# Patient Record
Sex: FEMALE | Race: WHITE | NOT HISPANIC OR LATINO | URBAN - METROPOLITAN AREA
[De-identification: names, ages, dates, MRNs, and addresses within clinical notes are randomized per-mention and may not be internally consistent; named-entity substitution may affect disease eponyms.]

---

## 2018-07-20 ENCOUNTER — EMERGENCY (EMERGENCY)
Facility: HOSPITAL | Age: 83
LOS: 1 days | Discharge: HOME | End: 2018-07-20
Attending: PERSONAL EMERGENCY RESPONSE ATTENDANT
Payer: MEDICARE

## 2018-07-20 VITALS
TEMPERATURE: 98 F | RESPIRATION RATE: 18 BRPM | OXYGEN SATURATION: 96 % | HEART RATE: 75 BPM | DIASTOLIC BLOOD PRESSURE: 90 MMHG | SYSTOLIC BLOOD PRESSURE: 189 MMHG

## 2018-07-20 LAB
ALBUMIN SERPL ELPH-MCNC: 4.6 G/DL — SIGNIFICANT CHANGE UP (ref 3.3–5)
ALP SERPL-CCNC: 68 U/L — SIGNIFICANT CHANGE UP (ref 40–120)
ALT FLD-CCNC: 23 U/L — SIGNIFICANT CHANGE UP (ref 10–45)
ANION GAP SERPL CALC-SCNC: 16 MMOL/L — SIGNIFICANT CHANGE UP (ref 5–17)
AST SERPL-CCNC: 27 U/L — SIGNIFICANT CHANGE UP (ref 10–40)
BASE EXCESS BLDV CALC-SCNC: 3.4 MMOL/L — HIGH (ref -2–2)
BASOPHILS # BLD AUTO: 0.1 K/UL — SIGNIFICANT CHANGE UP (ref 0–0.2)
BASOPHILS NFR BLD AUTO: 1.1 % — SIGNIFICANT CHANGE UP (ref 0–2)
BILIRUB SERPL-MCNC: 0.7 MG/DL — SIGNIFICANT CHANGE UP (ref 0.2–1.2)
BUN SERPL-MCNC: 30 MG/DL — HIGH (ref 7–23)
CA-I SERPL-SCNC: 1.12 MMOL/L — SIGNIFICANT CHANGE UP (ref 1.12–1.3)
CALCIUM SERPL-MCNC: 9.7 MG/DL — SIGNIFICANT CHANGE UP (ref 8.4–10.5)
CHLORIDE BLDV-SCNC: 94 MMOL/L — LOW (ref 96–108)
CHLORIDE SERPL-SCNC: 89 MMOL/L — LOW (ref 96–108)
CO2 BLDV-SCNC: 29 MMOL/L — SIGNIFICANT CHANGE UP (ref 22–30)
CO2 SERPL-SCNC: 24 MMOL/L — SIGNIFICANT CHANGE UP (ref 22–31)
CREAT SERPL-MCNC: 0.96 MG/DL — SIGNIFICANT CHANGE UP (ref 0.5–1.3)
EOSINOPHIL # BLD AUTO: 0.1 K/UL — SIGNIFICANT CHANGE UP (ref 0–0.5)
EOSINOPHIL NFR BLD AUTO: 0.9 % — SIGNIFICANT CHANGE UP (ref 0–6)
GAS PNL BLDV: 125 MMOL/L — LOW (ref 136–145)
GAS PNL BLDV: SIGNIFICANT CHANGE UP
GAS PNL BLDV: SIGNIFICANT CHANGE UP
GLUCOSE BLDV-MCNC: 104 MG/DL — HIGH (ref 70–99)
GLUCOSE SERPL-MCNC: 103 MG/DL — HIGH (ref 70–99)
HCO3 BLDV-SCNC: 28 MMOL/L — SIGNIFICANT CHANGE UP (ref 21–29)
HCT VFR BLD CALC: 49 % — HIGH (ref 34.5–45)
HCT VFR BLDA CALC: 49 % — SIGNIFICANT CHANGE UP (ref 39–50)
HGB BLD CALC-MCNC: 15.9 G/DL — HIGH (ref 11.5–15.5)
HGB BLD-MCNC: 16.4 G/DL — HIGH (ref 11.5–15.5)
HOROWITZ INDEX BLDV+IHG-RTO: SIGNIFICANT CHANGE UP
LACTATE BLDV-MCNC: 2.1 MMOL/L — HIGH (ref 0.7–2)
LIDOCAIN IGE QN: 47 U/L — SIGNIFICANT CHANGE UP (ref 7–60)
LYMPHOCYTES # BLD AUTO: 2.3 K/UL — SIGNIFICANT CHANGE UP (ref 1–3.3)
LYMPHOCYTES # BLD AUTO: 24.2 % — SIGNIFICANT CHANGE UP (ref 13–44)
MCHC RBC-ENTMCNC: 31.7 PG — SIGNIFICANT CHANGE UP (ref 27–34)
MCHC RBC-ENTMCNC: 33.6 GM/DL — SIGNIFICANT CHANGE UP (ref 32–36)
MCV RBC AUTO: 94.4 FL — SIGNIFICANT CHANGE UP (ref 80–100)
MONOCYTES # BLD AUTO: 0.9 K/UL — SIGNIFICANT CHANGE UP (ref 0–0.9)
MONOCYTES NFR BLD AUTO: 9.6 % — SIGNIFICANT CHANGE UP (ref 2–14)
NEUTROPHILS # BLD AUTO: 6 K/UL — SIGNIFICANT CHANGE UP (ref 1.8–7.4)
NEUTROPHILS NFR BLD AUTO: 64.2 % — SIGNIFICANT CHANGE UP (ref 43–77)
PCO2 BLDV: 43 MMHG — SIGNIFICANT CHANGE UP (ref 35–50)
PH BLDV: 7.43 — SIGNIFICANT CHANGE UP (ref 7.35–7.45)
PLATELET # BLD AUTO: 211 K/UL — SIGNIFICANT CHANGE UP (ref 150–400)
PO2 BLDV: 39 MMHG — SIGNIFICANT CHANGE UP (ref 25–45)
POTASSIUM BLDV-SCNC: 3.6 MMOL/L — SIGNIFICANT CHANGE UP (ref 3.5–5.3)
POTASSIUM SERPL-MCNC: 3.9 MMOL/L — SIGNIFICANT CHANGE UP (ref 3.5–5.3)
POTASSIUM SERPL-SCNC: 3.9 MMOL/L — SIGNIFICANT CHANGE UP (ref 3.5–5.3)
PROT SERPL-MCNC: 8 G/DL — SIGNIFICANT CHANGE UP (ref 6–8.3)
RBC # BLD: 5.19 M/UL — SIGNIFICANT CHANGE UP (ref 3.8–5.2)
RBC # FLD: 13.4 % — SIGNIFICANT CHANGE UP (ref 10.3–14.5)
SAO2 % BLDV: 70 % — SIGNIFICANT CHANGE UP (ref 67–88)
SODIUM SERPL-SCNC: 129 MMOL/L — LOW (ref 135–145)
WBC # BLD: 9.4 K/UL — SIGNIFICANT CHANGE UP (ref 3.8–10.5)
WBC # FLD AUTO: 9.4 K/UL — SIGNIFICANT CHANGE UP (ref 3.8–10.5)

## 2018-07-20 PROCEDURE — 99284 EMERGENCY DEPT VISIT MOD MDM: CPT

## 2018-07-20 PROCEDURE — 74177 CT ABD & PELVIS W/CONTRAST: CPT | Mod: 26

## 2018-07-20 RX ORDER — SODIUM CHLORIDE 9 MG/ML
1000 INJECTION INTRAMUSCULAR; INTRAVENOUS; SUBCUTANEOUS ONCE
Qty: 0 | Refills: 0 | Status: COMPLETED | OUTPATIENT
Start: 2018-07-20 | End: 2018-07-20

## 2018-07-20 NOTE — ED PROVIDER NOTE - OBJECTIVE STATEMENT
89 yo f with pmhx of htn, chf and pshx of total hysterectomy and cholecystectomy presents with bilateral lower abdominal pain since yesterday as well as loose stools. No radiation into back or chest. Took advil in the morning and imodium approximately 6.5 hours ago. Also had palpitations this morning and a headache. Denies burning urination, cp, nausea but has increased frequency of urination. Had one episode of vomiting 5 days ago, but none today. Takes baby aspirin. 89 yo f with pmhx of htn, chf and pshx of total hysterectomy and cholecystectomy presents with bilateral lower abdominal pain since earlier today. No radiation into back or chest. had been having constipation for the past several days, but now has diarrhea after taking laxatives. Took advil in the morning and imodium approximately 6.5 hours ago. Also had palpitations this morning and a headache. Denies burning urination, cp, nausea but has increased frequency of urination. Had one episode of vomiting 5 days ago, but none today. Takes baby aspirin. 89 yo f with pmhx of htn, chf and pshx of total hysterectomy and cholecystectomy presents with bilateral lower abdominal pain since earlier today. No radiation into back or chest. had been having constipation for the past several days, but now has diarrhea after taking laxatives. Took advil in the morning and imodium approximately 6.5 hours ago. Also had palpitations this morning and a headache. Denies burning urination, cp, nausea but has increased frequency of urination. Had one episode of non-bloody, nonbilious vomiting 5 days ago, but none today. Takes baby aspirin.

## 2018-07-20 NOTE — ED PROVIDER NOTE - NS_ ATTENDINGSCRIBEDETAILS _ED_A_ED_FT
Attending MD Davis.  Agree with above.  PT seen and assessed with scribe assistance in documentation in real time.

## 2018-07-20 NOTE — ED PROVIDER NOTE - CHPI ED SYMPTOMS POS
palpitations, headache, increased urinary frequency/PAIN DIARRHEA/palpitations, headache, increased urinary frequency/PAIN/VOMITING

## 2018-07-20 NOTE — ED ADULT NURSE NOTE - OBJECTIVE STATEMENT
89 y/o F patient presents to ED from home c/o abdominal pain and constipation x1 week. Patient reports decreased PO intake the past few days. As per patient she took Imodium with no relief. Patient reports last BM was 2 hours ago and was soft and "normal" in color. Patient states abdominal pain is non radiating, continuous and located in left and right lower quadrants. Patient A&Ox3. lungs CTA. skin warm and intact. abdominal tenderness noted in lower abdominal quadrants. Patient denies HA, dizziness, SOB, chest pain, N/V/D, bladder changes, dysuria, hematuria, blood in stool, fevers/chills. VSS. Safety and comfort measures provided and maintained. Family bedside. MD bedside.

## 2018-07-20 NOTE — ED PROVIDER NOTE - CARE PLAN
Principal Discharge DX:	Abdominal pain, unspecified abdominal location  Goal:	Bilateral lower quadrants Principal Discharge DX:	Abdominal pain, unspecified abdominal location  Goal:	Bilateral lower quadrants  Assessment and plan of treatment:	You were seen and evaluated in the emergency department for abdominal pain. You had a thorough evaluation including an exam, labs and imaging. You were given medications for comfort. Your workup did not demonstrate any dangerous findings. Your hernia was evaluated by surgeons who do not think it as the cause of your pain. This does not mean that your pain is not real, only that we were unable to find a dangerous or life-threatening cause. Please read the attached information sheets as they will provide useful information regarding your condition.    It is important to understand that while your workup was reassuring, no workup can completely exclude all concerning conditions. Therfore, please return if you develop worsening or concerning new symptoms including worsening pain, pain that spreads to new places, inability to tolerate an oral diet, new and worsening fevers and chills, weakness, inability to pass stool or flatulent gas, those included on the attached information sheets, or other findings concerning to you. Please take all your medications as prescribed.    You may take up to 1000mg of acetaminophen (Tylenol) every 6 hours as needed for pain. Many prescription pain medications and cough medications contain acetaminophen. If you take these medications, please discuss with your provider or primary care doctor if you need to adjust the dose of acetaminophen you can take. You may take 80mg of simethicone every 6 hours as needed for gas pain or discomfort.    Please visit a surgical supply store and buy an elastic hernia belt which may help protect your hernia.    Please be sure to follow up with your regular doctor in the next 2 days.   Please follow up with general surgery clinic in 1-2 weeks as needed.  Secondary Diagnosis:	Hernia

## 2018-07-20 NOTE — ED PROVIDER NOTE - PLAN OF CARE
Bilateral lower quadrants You were seen and evaluated in the emergency department for abdominal pain. You had a thorough evaluation including an exam, labs and imaging. You were given medications for comfort. Your workup did not demonstrate any dangerous findings. Your hernia was evaluated by surgeons who do not think it as the cause of your pain. This does not mean that your pain is not real, only that we were unable to find a dangerous or life-threatening cause. Please read the attached information sheets as they will provide useful information regarding your condition.    It is important to understand that while your workup was reassuring, no workup can completely exclude all concerning conditions. Therfore, please return if you develop worsening or concerning new symptoms including worsening pain, pain that spreads to new places, inability to tolerate an oral diet, new and worsening fevers and chills, weakness, inability to pass stool or flatulent gas, those included on the attached information sheets, or other findings concerning to you. Please take all your medications as prescribed.    You may take up to 1000mg of acetaminophen (Tylenol) every 6 hours as needed for pain. Many prescription pain medications and cough medications contain acetaminophen. If you take these medications, please discuss with your provider or primary care doctor if you need to adjust the dose of acetaminophen you can take. You may take 80mg of simethicone every 6 hours as needed for gas pain or discomfort.    Please visit a surgical supply store and buy an elastic hernia belt which may help protect your hernia.    Please be sure to follow up with your regular doctor in the next 2 days.   Please follow up with general surgery clinic in 1-2 weeks as needed.

## 2018-07-20 NOTE — ED PROVIDER NOTE - PROGRESS NOTE DETAILS
Attending MD Davis.  Pt and daughter updated re: findings of small hernia without current obstruction.  Pt and daughter reassured that with improvement in her pain she will likely be cleared to d/c but offered surg follow-up/cx.  Pt and daughter both concerned that severity of her pain earlier warrants surg consult.  Surg consulted and to eval pt.  Pt states this R inguinal defect has been present for several years and she only had severe pain this evening.  Concern for transient strangulation.  Surg to see. Attending MD Davis.  Pt remains in stable condition with surg cx pending.  Stable at time of signout ot incoming team pending surg consult. ATTENDING MD Alysha: Seen by surgery who feel hernia is currently nontender, easily reducible. Low risk for transient incarceration for chronic hernia without acute changes. Pt is comfortable and wants to leave. We will PO challenge. If patient is stable we will discharge, advise hernia belt, outpatient f/u. Pt is comfortable with this plan. ATTENDING MD Alysha: Tolerated PO well. Remains pain free. Stable for DC.

## 2018-07-20 NOTE — ED PROVIDER NOTE - MEDICAL DECISION MAKING DETAILS
Pt is 89 yo f with pmhx of htn, chf s/p remote cholecystectomy and remote total hysterectomy p/w several days of constipation followed by diarrhea after laxatives. Now took imodium earlier today. Onset of severe 10/10 abdominal pain bilateral lower quadrant this evening, not associated with n/v and no radiation to back or chest. pt states that pain is largely resolved on arrival to ED. Limited TTP, no abdominal distention. Possible hx of Afib, on 81mg of aspirin only. Will obtain screening, ekg, and vbg ans well as labs and obtain ct abdomen/pelvis. Pt is 89 yo f with pmhx of htn, chf s/p remote cholecystectomy and remote total hysterectomy p/w several days of constipation followed by diarrhea after laxatives. Now took imodium earlier today. Onset of severe 10/10 abdominal pain bilateral lower quadrant this evening, not associated with n/v and no radiation to back or chest. pt states that pain is largely resolved on arrival to ED. Limited TTP, no abdominal distention. Possible hx of Afib, on 81mg of aspirin only. Will obtain screening, ekg, and vbg as well as labs and obtain ct abdomen/pelvis.

## 2018-07-21 VITALS
DIASTOLIC BLOOD PRESSURE: 81 MMHG | TEMPERATURE: 98 F | HEART RATE: 68 BPM | RESPIRATION RATE: 18 BRPM | SYSTOLIC BLOOD PRESSURE: 157 MMHG | OXYGEN SATURATION: 96 %

## 2018-07-21 DIAGNOSIS — Z90.710 ACQUIRED ABSENCE OF BOTH CERVIX AND UTERUS: Chronic | ICD-10-CM

## 2018-07-21 DIAGNOSIS — Z90.49 ACQUIRED ABSENCE OF OTHER SPECIFIED PARTS OF DIGESTIVE TRACT: Chronic | ICD-10-CM

## 2018-07-21 LAB
APPEARANCE UR: CLEAR — SIGNIFICANT CHANGE UP
BILIRUB UR-MCNC: NEGATIVE — SIGNIFICANT CHANGE UP
COLOR SPEC: COLORLESS — SIGNIFICANT CHANGE UP
CULTURE RESULTS: SIGNIFICANT CHANGE UP
DIFF PNL FLD: NEGATIVE — SIGNIFICANT CHANGE UP
GLUCOSE UR QL: NEGATIVE — SIGNIFICANT CHANGE UP
KETONES UR-MCNC: NEGATIVE — SIGNIFICANT CHANGE UP
LEUKOCYTE ESTERASE UR-ACNC: NEGATIVE — SIGNIFICANT CHANGE UP
NITRITE UR-MCNC: NEGATIVE — SIGNIFICANT CHANGE UP
PH UR: 7 — SIGNIFICANT CHANGE UP (ref 5–8)
PROT UR-MCNC: NEGATIVE — SIGNIFICANT CHANGE UP
RBC CASTS # UR COMP ASSIST: SIGNIFICANT CHANGE UP /HPF (ref 0–2)
SP GR SPEC: 1.02 — SIGNIFICANT CHANGE UP (ref 1.01–1.02)
SPECIMEN SOURCE: SIGNIFICANT CHANGE UP
UROBILINOGEN FLD QL: NEGATIVE — SIGNIFICANT CHANGE UP
WBC UR QL: SIGNIFICANT CHANGE UP /HPF (ref 0–5)

## 2018-07-21 PROCEDURE — 82435 ASSAY OF BLOOD CHLORIDE: CPT

## 2018-07-21 PROCEDURE — 85027 COMPLETE CBC AUTOMATED: CPT

## 2018-07-21 PROCEDURE — 99284 EMERGENCY DEPT VISIT MOD MDM: CPT

## 2018-07-21 PROCEDURE — 83690 ASSAY OF LIPASE: CPT

## 2018-07-21 PROCEDURE — 74177 CT ABD & PELVIS W/CONTRAST: CPT

## 2018-07-21 PROCEDURE — 81001 URINALYSIS AUTO W/SCOPE: CPT

## 2018-07-21 PROCEDURE — 87086 URINE CULTURE/COLONY COUNT: CPT

## 2018-07-21 PROCEDURE — 80053 COMPREHEN METABOLIC PANEL: CPT

## 2018-07-21 PROCEDURE — 84295 ASSAY OF SERUM SODIUM: CPT

## 2018-07-21 PROCEDURE — 84132 ASSAY OF SERUM POTASSIUM: CPT

## 2018-07-21 PROCEDURE — 85014 HEMATOCRIT: CPT

## 2018-07-21 PROCEDURE — 82803 BLOOD GASES ANY COMBINATION: CPT

## 2018-07-21 PROCEDURE — 82947 ASSAY GLUCOSE BLOOD QUANT: CPT

## 2018-07-21 PROCEDURE — 82330 ASSAY OF CALCIUM: CPT

## 2018-07-21 PROCEDURE — 83605 ASSAY OF LACTIC ACID: CPT

## 2018-07-21 RX ORDER — SIMETHICONE 80 MG/1
80 TABLET, CHEWABLE ORAL ONCE
Qty: 0 | Refills: 0 | Status: COMPLETED | OUTPATIENT
Start: 2018-07-21 | End: 2018-07-21

## 2018-07-21 RX ORDER — ACETAMINOPHEN 500 MG
650 TABLET ORAL ONCE
Qty: 0 | Refills: 0 | Status: COMPLETED | OUTPATIENT
Start: 2018-07-21 | End: 2018-07-21

## 2018-07-21 RX ADMIN — Medication 650 MILLIGRAM(S): at 04:59

## 2018-07-21 RX ADMIN — SODIUM CHLORIDE 500 MILLILITER(S): 9 INJECTION INTRAMUSCULAR; INTRAVENOUS; SUBCUTANEOUS at 00:40

## 2018-07-21 NOTE — CONSULT NOTE ADULT - SUBJECTIVE AND OBJECTIVE BOX
ACUTE CARE SURGERY CONSULT    Consulting surgical team: [facility] - Acute Care Surgery (pager [phone])  Consulting attending: Ana  Physician requesting consult: Francisco Encarnacion  Reason for consultation: R inguinal hernia    HPI:  Patient is a 89y Female, who lives alone in  and is largely self-sufficient, presenting with complaint of crampy lower abdominal pain (9/10 in severity) which began earlier on the day presentation. Pain was largely alleviated by laying supine. She reports a longstanding history of a R inguinal hernia, for which she has never sought (and is not interested in) operative repair. She had a recent history of constipation, for which she's been taking several laxatives/stool softeners over the past few days. As a result, she's been having several bouts of diarrhea and passing large volumes of flatus. She denies any irreducible protrusion of her hernia during her episode of lower abdominal pain. No nausea/emesis, no fever/chills. At present, she reports no pain - neither while laying flat nor while standing up.        PAST MEDICAL HISTORY:  Congestive heart failure  Hypertension      PAST SURGICAL HISTORY:  S/P cholecystectomy  H/O total hysterectomy      ALLERGIES:  No Known Allergies      VITALS & I/Os:  Vital Signs Last 24 Hrs  T(C): 36.5 (2018 04:32), Max: 36.7 (2018 21:21)  T(F): 97.7 (2018 04:32), Max: 98.1 (2018 21:21)  HR: 68 (2018 04:32) (68 - 75)  BP: 157/81 (2018 04:32) (157/81 - 189/90)  BP(mean): --  RR: 18 (2018 04:32) (18 - 18)  SpO2: 96% (2018 04:32) (96% - 99%)  CAPILLARY BLOOD GLUCOSE        GEN: NAD, alert and oriented x 3  HEENT: WNL  CHEST: Symmetrical chest rise, breath sounds CTAB  HEART: RRR, non-muffled heart sounds  ABD: Soft, non-tender, non-distended. R inguinal hernia, with bowel palpable - non-tender, no overlying skin changes, easily reducible (even while standing up).   EXT/VASC: B/L lower extremity edema. Otherwise, within normal limits - warm, capillary refill < 2 secs, grossly motor/sensory intact.        LABS:                        16.4   9.4   )-----------( 211      ( 2018 22:03 )             49.0     07-    129<L>  |  89<L>  |  30<H>  ----------------------------<  103<H>  3.9   |  24  |  0.96    Ca    9.7      2018 22:03    TPro  8.0  /  Alb  4.6  /  TBili  0.7  /  DBili  x   /  AST  27  /  ALT  23  /  AlkPhos  68  07-    Lactate:           07-20 @ 22:03  2.1    Urinalysis Basic - ( 2018 00:48 )    Color: Colorless / Appearance: Clear / S.023 / pH: x  Gluc: x / Ketone: Negative  / Bili: Negative / Urobili: Negative   Blood: x / Protein: Negative / Nitrite: Negative   Leuk Esterase: Negative / RBC: 0-2 /HPF / WBC 0-2 /HPF   Sq Epi: x / Non Sq Epi: x / Bacteria: x        IMAGING:  < from: CT Abdomen and Pelvis w/ Oral Cont and w/ IV Cont (18 @ 23:27) >  IMPRESSION:     Intrahepatic and extrahepatic biliary ductal dilatation without a   definite obstructing lesion. This may be related to postcholecystectomy   state and age. Correlate with laboratory values. Follow-up imaging as   indicated.    Colonic diverticulosis without diverticulitis, most pronounced along the   sigmoid colon.    Small knuckle of small bowel enters a right inguinal hernia without   evidence of obstruction.    < end of copied text >        ASSESSMENT & PLAN  89y with non-obstructing, reducible R inguinal hernia present for many years. No signs/symptoms of ischemic bowel on examination or imaging. Recommend no surgical intervention at this time. Patient wishes to return home, and follow up with her own physician team. If patient can tolerate a PO challenge, no objections to discharge from surgical standpoint.      Discussed with Attending (Ana)

## 2018-09-29 ENCOUNTER — INPATIENT (INPATIENT)
Facility: HOSPITAL | Age: 83
LOS: 4 days | Discharge: NURSING, REHAB OR LONG-TERM CARE | DRG: 481 | End: 2018-10-04
Attending: SPECIALIST | Admitting: SPECIALIST
Payer: MEDICARE

## 2018-09-29 ENCOUNTER — TRANSCRIPTION ENCOUNTER (OUTPATIENT)
Age: 83
End: 2018-09-29

## 2018-09-29 VITALS
SYSTOLIC BLOOD PRESSURE: 136 MMHG | HEART RATE: 61 BPM | RESPIRATION RATE: 18 BRPM | DIASTOLIC BLOOD PRESSURE: 74 MMHG | TEMPERATURE: 98 F | OXYGEN SATURATION: 97 %

## 2018-09-29 DIAGNOSIS — Z90.710 ACQUIRED ABSENCE OF BOTH CERVIX AND UTERUS: Chronic | ICD-10-CM

## 2018-09-29 DIAGNOSIS — Z90.49 ACQUIRED ABSENCE OF OTHER SPECIFIED PARTS OF DIGESTIVE TRACT: Chronic | ICD-10-CM

## 2018-09-29 PROCEDURE — 70450 CT HEAD/BRAIN W/O DYE: CPT | Mod: 26

## 2018-09-29 PROCEDURE — 93010 ELECTROCARDIOGRAM REPORT: CPT

## 2018-09-29 PROCEDURE — 99285 EMERGENCY DEPT VISIT HI MDM: CPT | Mod: 25

## 2018-09-29 RX ORDER — FENTANYL CITRATE 50 UG/ML
25 INJECTION INTRAVENOUS ONCE
Qty: 0 | Refills: 0 | Status: DISCONTINUED | OUTPATIENT
Start: 2018-09-29 | End: 2018-09-29

## 2018-09-29 RX ORDER — FENTANYL CITRATE 50 UG/ML
50 INJECTION INTRAVENOUS ONCE
Qty: 0 | Refills: 0 | Status: DISCONTINUED | OUTPATIENT
Start: 2018-09-29 | End: 2018-09-29

## 2018-09-29 RX ADMIN — FENTANYL CITRATE 50 MICROGRAM(S): 50 INJECTION INTRAVENOUS at 23:43

## 2018-09-29 NOTE — ED PROVIDER NOTE - PHYSICAL EXAMINATION
GEN APPEARANCE: WDWN, alert and cooperative, non-toxic appearing and in NAD  HEAD: Atraumatic, normocephalic   EYES: PERRLa, EOMI, vision grossly intact.   EARS: Gross hearing intact.   NOSE: No nasal discharge, no external evidence of epistaxis.   THROAT: MMM. Oral cavity and pharynx normal. No inflammation, no swelling, no exudate, no oral lesions.  NECK: Supple  CV: RRR, S1S2, no c/r/m/g. No cyanosis or pallor. Extremities warm, well perfused. Cap refill <2 seconds. No bruits.   LUNGS: CTAB. No wheezing. No rales. No rhonchi. No diminished breath sounds.   ABDOMEN: Soft, NTND. No guarding or rebound. No masses.   MSK: Spine appears normal, no spine point tenderness. No CVA ttp. No joint erythema or tenderness. Normal muscular development. Pelvis stable.  EXTREMITIES: RLE appears short, externally rotated severe ttp along R hip, thigh knee,  No peripheral edema.   NEURO: Alert, follows commands. Weight bearing normal. Speech normal. Sensation and motor normal x4 extremities.   SKIN: Normal color for race, warm, dry and intact. No evidence of rash.  PSYCH: Normal mood and affect.

## 2018-09-29 NOTE — ED PROVIDER NOTE - OBJECTIVE STATEMENT
89F CHF HTN on ASA presents s/p mechanical witnessed fall at home today, normally ambulates with walker, tripped, struck head on door while falling no LOC recalls entire event unable to ambulate since fall c/o severe RLE pain, no prior injury to RLE in past. Denies numbness, tingling or loss of sensation. Denies loss of motor function. No chest pain or palpitations prior to fall, no visual changes. Denies n/v/f/c/cp/sob. Denies headache, syncope, lightheadedness, dizziness. Denies chest palpitations, abdominal pain. Denies dysuria, hematuria, hematochezia, BRBPR, tarry stools, diarrhea, constipation.

## 2018-09-29 NOTE — ED PROVIDER NOTE - PROGRESS NOTE DETAILS
Attending MD Monroy: Discussed with family results of XR, patient made NPO, maintenance IVFs ordered, Ortho consulted.  Patient admitted.

## 2018-09-29 NOTE — ED PROVIDER NOTE - CARE PLAN
Principal Discharge DX:	Hip fracture Principal Discharge DX:	Closed fracture of right hip, initial encounter

## 2018-09-29 NOTE — ED ADULT NURSE NOTE - OBJECTIVE STATEMENT
89y/o female BIBEMS a&ox3 s/p mechanical fall. As per daughter, patient was walking with cane when she lost her footing and fell landing on right side. Fall witness by son-in-law. Patient denies LOC, state she thinks she hit back of her head on something. Presents to ED c/o pain to right upper thigh and hip. Denies any other pain or complaints. Lungs clear b/l. Abd soft, nontender, nondistended. Right lower extremity shortened and externally rotated. Awaiting MD christian.

## 2018-09-29 NOTE — ED PROVIDER NOTE - ATTENDING CONTRIBUTION TO CARE
Attending MD Monroy: I personally have seen and examined this patient.  Resident note reviewed and agree on plan of care and except where noted.  See below for details.     89F with PMH including CHF HTN on ASA presents to the ED s/p witnessed fall.  Reports typically ambulates with walker.  Today at around 8pm, tripped and fell, struck head on dog kennel on the way down.  Denies preceding dizziness, weakness, sensory changes.  Denies LOC. Able to recall details surrounding event.  Unable to ambulate even with assistance since incident.  Denies chest pain, shortness of breath, palpitations. Denies abdominal pain, nausea, vomiting, diarrhea, blood in stools. Denies change in vision, double vision, sudden loss of vision. Denies numbness/weakness/tingling in extremities.  Denies HA.  Denies urinary complaints.  On exam, NAD, head NCAT, PERRL, FROM at neck, no tenderness to palpation or stepoffs along length of spine, lungs CTAB with good inspiratory effort, +S1S2, no m/r/g, abdomen soft with +BS, NT, ND, no CVAT, moving all extremities with 5/5 strength bilateral upper and lower extremities except for RLE which she is holding in external rotation and minimal movement, good and equal  strength bilaterally, +R leg foreshortened and externally rotated, +tenderness to palpation at R hip, +2 DPs; A/P: 89F s/p fall, suspect hip fracture, will obtain XRs of HIp, pelvis, femur, pain control, pre op labs, likely ortho consult, EKG, admit

## 2018-09-29 NOTE — ED PROVIDER NOTE - MEDICAL DECISION MAKING DETAILS
Avni PGY2: 2F CHF HTN on ASA presents s/p mechanical witnessed fall at home today, normally ambulates with walker, tripped, struck head on door while falling no LOC recalls entire event unable to ambulate since fall c/o severe RLE pain, no prior injury to RLE in past. exam vss well appearing non toxic RLE short rotated c/f hip fx will get cth xr labs admit

## 2018-09-29 NOTE — ED ADULT NURSE NOTE - NSIMPLEMENTINTERV_GEN_ALL_ED
Implemented All Fall with Harm Risk Interventions:  [place] to call system. Call bell, personal items and telephone within reach. Instruct patient to call for assistance. Room bathroom lighting operational. Non-slip footwear when patient is off stretcher. Physically safe environment: no spills, clutter or unnecessary equipment. Stretcher in lowest position, wheels locked, appropriate side rails in place. Provide visual cue, wrist band, yellow gown, etc. Monitor gait and stability. Monitor for mental status changes and reorient to person, place, and time. Review medications for side effects contributing to fall risk. Reinforce activity limits and safety measures with patient and family. Provide visual clues: red socks.

## 2018-09-30 DIAGNOSIS — S72.009A FRACTURE OF UNSPECIFIED PART OF NECK OF UNSPECIFIED FEMUR, INITIAL ENCOUNTER FOR CLOSED FRACTURE: ICD-10-CM

## 2018-09-30 DIAGNOSIS — Z95.0 PRESENCE OF CARDIAC PACEMAKER: Chronic | ICD-10-CM

## 2018-09-30 PROBLEM — I50.9 HEART FAILURE, UNSPECIFIED: Chronic | Status: ACTIVE | Noted: 2018-07-21

## 2018-09-30 PROBLEM — I10 ESSENTIAL (PRIMARY) HYPERTENSION: Chronic | Status: ACTIVE | Noted: 2018-07-21

## 2018-09-30 LAB
ALBUMIN SERPL ELPH-MCNC: 3 G/DL — LOW (ref 3.3–5)
ALP SERPL-CCNC: 88 U/L — SIGNIFICANT CHANGE UP (ref 40–120)
ALT FLD-CCNC: 24 U/L — SIGNIFICANT CHANGE UP (ref 10–45)
ANION GAP SERPL CALC-SCNC: 11 MMOL/L — SIGNIFICANT CHANGE UP (ref 5–17)
ANION GAP SERPL CALC-SCNC: 11 MMOL/L — SIGNIFICANT CHANGE UP (ref 5–17)
ANION GAP SERPL CALC-SCNC: 12 MMOL/L — SIGNIFICANT CHANGE UP (ref 5–17)
APPEARANCE UR: ABNORMAL
APTT BLD: 28.9 SEC — SIGNIFICANT CHANGE UP (ref 27.5–37.4)
APTT BLD: 29.2 SEC — SIGNIFICANT CHANGE UP (ref 27.5–37.4)
AST SERPL-CCNC: 29 U/L — SIGNIFICANT CHANGE UP (ref 10–40)
BASOPHILS # BLD AUTO: 0 K/UL — SIGNIFICANT CHANGE UP (ref 0–0.2)
BASOPHILS # BLD AUTO: 0 K/UL — SIGNIFICANT CHANGE UP (ref 0–0.2)
BASOPHILS NFR BLD AUTO: 0.1 % — SIGNIFICANT CHANGE UP (ref 0–2)
BASOPHILS NFR BLD AUTO: 0.2 % — SIGNIFICANT CHANGE UP (ref 0–2)
BILIRUB SERPL-MCNC: 0.3 MG/DL — SIGNIFICANT CHANGE UP (ref 0.2–1.2)
BILIRUB UR-MCNC: NEGATIVE — SIGNIFICANT CHANGE UP
BLD GP AB SCN SERPL QL: NEGATIVE — SIGNIFICANT CHANGE UP
BLD GP AB SCN SERPL QL: NEGATIVE — SIGNIFICANT CHANGE UP
BUN SERPL-MCNC: 30 MG/DL — HIGH (ref 7–23)
BUN SERPL-MCNC: 32 MG/DL — HIGH (ref 7–23)
BUN SERPL-MCNC: 32 MG/DL — HIGH (ref 7–23)
CALCIUM SERPL-MCNC: 7.6 MG/DL — LOW (ref 8.4–10.5)
CALCIUM SERPL-MCNC: 8.3 MG/DL — LOW (ref 8.4–10.5)
CALCIUM SERPL-MCNC: 8.3 MG/DL — LOW (ref 8.4–10.5)
CHLORIDE SERPL-SCNC: 96 MMOL/L — SIGNIFICANT CHANGE UP (ref 96–108)
CHLORIDE SERPL-SCNC: 96 MMOL/L — SIGNIFICANT CHANGE UP (ref 96–108)
CHLORIDE SERPL-SCNC: 99 MMOL/L — SIGNIFICANT CHANGE UP (ref 96–108)
CO2 SERPL-SCNC: 22 MMOL/L — SIGNIFICANT CHANGE UP (ref 22–31)
CO2 SERPL-SCNC: 23 MMOL/L — SIGNIFICANT CHANGE UP (ref 22–31)
CO2 SERPL-SCNC: 24 MMOL/L — SIGNIFICANT CHANGE UP (ref 22–31)
COLOR SPEC: YELLOW — SIGNIFICANT CHANGE UP
CREAT SERPL-MCNC: 0.99 MG/DL — SIGNIFICANT CHANGE UP (ref 0.5–1.3)
CREAT SERPL-MCNC: 1.16 MG/DL — SIGNIFICANT CHANGE UP (ref 0.5–1.3)
CREAT SERPL-MCNC: 1.16 MG/DL — SIGNIFICANT CHANGE UP (ref 0.5–1.3)
DIFF PNL FLD: ABNORMAL
EOSINOPHIL # BLD AUTO: 0.1 K/UL — SIGNIFICANT CHANGE UP (ref 0–0.5)
EOSINOPHIL # BLD AUTO: 0.2 K/UL — SIGNIFICANT CHANGE UP (ref 0–0.5)
EOSINOPHIL NFR BLD AUTO: 0.8 % — SIGNIFICANT CHANGE UP (ref 0–6)
EOSINOPHIL NFR BLD AUTO: 1 % — SIGNIFICANT CHANGE UP (ref 0–6)
GLUCOSE SERPL-MCNC: 112 MG/DL — HIGH (ref 70–99)
GLUCOSE SERPL-MCNC: 115 MG/DL — HIGH (ref 70–99)
GLUCOSE SERPL-MCNC: 138 MG/DL — HIGH (ref 70–99)
GLUCOSE UR QL: NEGATIVE — SIGNIFICANT CHANGE UP
HCT VFR BLD CALC: 26.8 % — LOW (ref 34.5–45)
HCT VFR BLD CALC: 32.5 % — LOW (ref 34.5–45)
HCT VFR BLD CALC: 36.7 % — SIGNIFICANT CHANGE UP (ref 34.5–45)
HGB BLD-MCNC: 10.7 G/DL — LOW (ref 11.5–15.5)
HGB BLD-MCNC: 11.9 G/DL — SIGNIFICANT CHANGE UP (ref 11.5–15.5)
HGB BLD-MCNC: 9 G/DL — LOW (ref 11.5–15.5)
INR BLD: 1 RATIO — SIGNIFICANT CHANGE UP (ref 0.88–1.16)
INR BLD: 1.01 RATIO — SIGNIFICANT CHANGE UP (ref 0.88–1.16)
KETONES UR-MCNC: NEGATIVE — SIGNIFICANT CHANGE UP
LEUKOCYTE ESTERASE UR-ACNC: ABNORMAL
LYMPHOCYTES # BLD AUTO: 0.8 K/UL — LOW (ref 1–3.3)
LYMPHOCYTES # BLD AUTO: 1 K/UL — SIGNIFICANT CHANGE UP (ref 1–3.3)
LYMPHOCYTES # BLD AUTO: 5.4 % — LOW (ref 13–44)
LYMPHOCYTES # BLD AUTO: 6 % — LOW (ref 13–44)
MCHC RBC-ENTMCNC: 30.4 PG — SIGNIFICANT CHANGE UP (ref 27–34)
MCHC RBC-ENTMCNC: 30.8 PG — SIGNIFICANT CHANGE UP (ref 27–34)
MCHC RBC-ENTMCNC: 31.4 PG — SIGNIFICANT CHANGE UP (ref 27–34)
MCHC RBC-ENTMCNC: 32.3 GM/DL — SIGNIFICANT CHANGE UP (ref 32–36)
MCHC RBC-ENTMCNC: 32.9 GM/DL — SIGNIFICANT CHANGE UP (ref 32–36)
MCHC RBC-ENTMCNC: 33.6 GM/DL — SIGNIFICANT CHANGE UP (ref 32–36)
MCV RBC AUTO: 93.5 FL — SIGNIFICANT CHANGE UP (ref 80–100)
MCV RBC AUTO: 93.5 FL — SIGNIFICANT CHANGE UP (ref 80–100)
MCV RBC AUTO: 94 FL — SIGNIFICANT CHANGE UP (ref 80–100)
MONOCYTES # BLD AUTO: 0.6 K/UL — SIGNIFICANT CHANGE UP (ref 0–0.9)
MONOCYTES # BLD AUTO: 0.9 K/UL — SIGNIFICANT CHANGE UP (ref 0–0.9)
MONOCYTES NFR BLD AUTO: 3.9 % — SIGNIFICANT CHANGE UP (ref 2–14)
MONOCYTES NFR BLD AUTO: 6.4 % — SIGNIFICANT CHANGE UP (ref 2–14)
NEUTROPHILS # BLD AUTO: 12.9 K/UL — HIGH (ref 1.8–7.4)
NEUTROPHILS # BLD AUTO: 14.2 K/UL — HIGH (ref 1.8–7.4)
NEUTROPHILS NFR BLD AUTO: 87.1 % — HIGH (ref 43–77)
NEUTROPHILS NFR BLD AUTO: 89 % — HIGH (ref 43–77)
NITRITE UR-MCNC: NEGATIVE — SIGNIFICANT CHANGE UP
PH UR: 6 — SIGNIFICANT CHANGE UP (ref 5–8)
PLATELET # BLD AUTO: 176 K/UL — SIGNIFICANT CHANGE UP (ref 150–400)
PLATELET # BLD AUTO: 206 K/UL — SIGNIFICANT CHANGE UP (ref 150–400)
PLATELET # BLD AUTO: 226 K/UL — SIGNIFICANT CHANGE UP (ref 150–400)
POTASSIUM SERPL-MCNC: 4.1 MMOL/L — SIGNIFICANT CHANGE UP (ref 3.5–5.3)
POTASSIUM SERPL-MCNC: 4.1 MMOL/L — SIGNIFICANT CHANGE UP (ref 3.5–5.3)
POTASSIUM SERPL-MCNC: 4.4 MMOL/L — SIGNIFICANT CHANGE UP (ref 3.5–5.3)
POTASSIUM SERPL-SCNC: 4.1 MMOL/L — SIGNIFICANT CHANGE UP (ref 3.5–5.3)
POTASSIUM SERPL-SCNC: 4.1 MMOL/L — SIGNIFICANT CHANGE UP (ref 3.5–5.3)
POTASSIUM SERPL-SCNC: 4.4 MMOL/L — SIGNIFICANT CHANGE UP (ref 3.5–5.3)
PROT SERPL-MCNC: 5.9 G/DL — LOW (ref 6–8.3)
PROT UR-MCNC: ABNORMAL
PROTHROM AB SERPL-ACNC: 10.8 SEC — SIGNIFICANT CHANGE UP (ref 9.8–12.7)
PROTHROM AB SERPL-ACNC: 10.9 SEC — SIGNIFICANT CHANGE UP (ref 9.8–12.7)
RBC # BLD: 2.87 M/UL — LOW (ref 3.8–5.2)
RBC # BLD: 3.48 M/UL — LOW (ref 3.8–5.2)
RBC # BLD: 3.9 M/UL — SIGNIFICANT CHANGE UP (ref 3.8–5.2)
RBC # FLD: 14 % — SIGNIFICANT CHANGE UP (ref 10.3–14.5)
RBC # FLD: 14 % — SIGNIFICANT CHANGE UP (ref 10.3–14.5)
RBC # FLD: 14.2 % — SIGNIFICANT CHANGE UP (ref 10.3–14.5)
RH IG SCN BLD-IMP: POSITIVE — SIGNIFICANT CHANGE UP
RH IG SCN BLD-IMP: POSITIVE — SIGNIFICANT CHANGE UP
SODIUM SERPL-SCNC: 131 MMOL/L — LOW (ref 135–145)
SODIUM SERPL-SCNC: 131 MMOL/L — LOW (ref 135–145)
SODIUM SERPL-SCNC: 132 MMOL/L — LOW (ref 135–145)
SP GR SPEC: 1.03 — HIGH (ref 1.01–1.02)
UROBILINOGEN FLD QL: ABNORMAL
WBC # BLD: 14.8 K/UL — HIGH (ref 3.8–10.5)
WBC # BLD: 16 K/UL — HIGH (ref 3.8–10.5)
WBC # BLD: 20.1 K/UL — HIGH (ref 3.8–10.5)
WBC # FLD AUTO: 14.8 K/UL — HIGH (ref 3.8–10.5)
WBC # FLD AUTO: 16 K/UL — HIGH (ref 3.8–10.5)
WBC # FLD AUTO: 20.1 K/UL — HIGH (ref 3.8–10.5)

## 2018-09-30 PROCEDURE — 71045 X-RAY EXAM CHEST 1 VIEW: CPT | Mod: 26

## 2018-09-30 PROCEDURE — 73552 X-RAY EXAM OF FEMUR 2/>: CPT | Mod: 26,RT

## 2018-09-30 PROCEDURE — 73502 X-RAY EXAM HIP UNI 2-3 VIEWS: CPT | Mod: 26,RT

## 2018-09-30 PROCEDURE — 93306 TTE W/DOPPLER COMPLETE: CPT | Mod: 26

## 2018-09-30 PROCEDURE — 73560 X-RAY EXAM OF KNEE 1 OR 2: CPT | Mod: 26,RT

## 2018-09-30 PROCEDURE — 27245 TREAT THIGH FRACTURE: CPT | Mod: RT

## 2018-09-30 RX ORDER — SODIUM CHLORIDE 9 MG/ML
1000 INJECTION, SOLUTION INTRAVENOUS
Qty: 0 | Refills: 0 | Status: DISCONTINUED | OUTPATIENT
Start: 2018-09-30 | End: 2018-09-30

## 2018-09-30 RX ORDER — AMLODIPINE BESYLATE 2.5 MG/1
10 TABLET ORAL DAILY
Qty: 0 | Refills: 0 | Status: DISCONTINUED | OUTPATIENT
Start: 2018-09-30 | End: 2018-10-04

## 2018-09-30 RX ORDER — ACETAMINOPHEN 500 MG
1000 TABLET ORAL ONCE
Qty: 0 | Refills: 0 | Status: COMPLETED | OUTPATIENT
Start: 2018-09-30 | End: 2018-09-30

## 2018-09-30 RX ORDER — ATENOLOL 25 MG/1
50 TABLET ORAL DAILY
Qty: 0 | Refills: 0 | Status: DISCONTINUED | OUTPATIENT
Start: 2018-09-30 | End: 2018-09-30

## 2018-09-30 RX ORDER — OXYBUTYNIN CHLORIDE 5 MG
5 TABLET ORAL DAILY
Qty: 0 | Refills: 0 | Status: DISCONTINUED | OUTPATIENT
Start: 2018-09-30 | End: 2018-09-30

## 2018-09-30 RX ORDER — SODIUM CHLORIDE 9 MG/ML
1000 INJECTION, SOLUTION INTRAVENOUS
Qty: 0 | Refills: 0 | Status: DISCONTINUED | OUTPATIENT
Start: 2018-09-30 | End: 2018-10-02

## 2018-09-30 RX ORDER — ACETAMINOPHEN 500 MG
650 TABLET ORAL EVERY 6 HOURS
Qty: 0 | Refills: 0 | Status: DISCONTINUED | OUTPATIENT
Start: 2018-09-30 | End: 2018-09-30

## 2018-09-30 RX ORDER — ACETAMINOPHEN 500 MG
500 TABLET ORAL ONCE
Qty: 0 | Refills: 0 | Status: COMPLETED | OUTPATIENT
Start: 2018-09-30 | End: 2018-09-30

## 2018-09-30 RX ORDER — TRAMADOL HYDROCHLORIDE 50 MG/1
50 TABLET ORAL
Qty: 0 | Refills: 0 | COMMUNITY

## 2018-09-30 RX ORDER — HYDROMORPHONE HYDROCHLORIDE 2 MG/ML
0.5 INJECTION INTRAMUSCULAR; INTRAVENOUS; SUBCUTANEOUS
Qty: 0 | Refills: 0 | Status: DISCONTINUED | OUTPATIENT
Start: 2018-09-30 | End: 2018-09-30

## 2018-09-30 RX ORDER — ALLOPURINOL 300 MG
100 TABLET ORAL DAILY
Qty: 0 | Refills: 0 | Status: DISCONTINUED | OUTPATIENT
Start: 2018-09-30 | End: 2018-09-30

## 2018-09-30 RX ORDER — HYDROMORPHONE HYDROCHLORIDE 2 MG/ML
0.25 INJECTION INTRAMUSCULAR; INTRAVENOUS; SUBCUTANEOUS
Qty: 0 | Refills: 0 | Status: DISCONTINUED | OUTPATIENT
Start: 2018-09-30 | End: 2018-09-30

## 2018-09-30 RX ORDER — ALLOPURINOL 300 MG
100 TABLET ORAL DAILY
Qty: 0 | Refills: 0 | Status: DISCONTINUED | OUTPATIENT
Start: 2018-09-30 | End: 2018-10-04

## 2018-09-30 RX ORDER — SENNA PLUS 8.6 MG/1
2 TABLET ORAL AT BEDTIME
Qty: 0 | Refills: 0 | Status: DISCONTINUED | OUTPATIENT
Start: 2018-09-30 | End: 2018-10-01

## 2018-09-30 RX ORDER — OXYBUTYNIN CHLORIDE 5 MG
5 TABLET ORAL DAILY
Qty: 0 | Refills: 0 | Status: DISCONTINUED | OUTPATIENT
Start: 2018-09-30 | End: 2018-10-04

## 2018-09-30 RX ORDER — OXYCODONE HYDROCHLORIDE 5 MG/1
2.5 TABLET ORAL EVERY 4 HOURS
Qty: 0 | Refills: 0 | Status: DISCONTINUED | OUTPATIENT
Start: 2018-09-30 | End: 2018-10-04

## 2018-09-30 RX ORDER — PANTOPRAZOLE SODIUM 20 MG/1
40 TABLET, DELAYED RELEASE ORAL
Qty: 0 | Refills: 0 | Status: DISCONTINUED | OUTPATIENT
Start: 2018-09-30 | End: 2018-09-30

## 2018-09-30 RX ORDER — MAGNESIUM HYDROXIDE 400 MG/1
30 TABLET, CHEWABLE ORAL DAILY
Qty: 0 | Refills: 0 | Status: DISCONTINUED | OUTPATIENT
Start: 2018-09-30 | End: 2018-10-04

## 2018-09-30 RX ORDER — OXYCODONE HYDROCHLORIDE 5 MG/1
5 TABLET ORAL EVERY 4 HOURS
Qty: 0 | Refills: 0 | Status: DISCONTINUED | OUTPATIENT
Start: 2018-09-30 | End: 2018-10-04

## 2018-09-30 RX ORDER — POLYETHYLENE GLYCOL 3350 17 G/17G
17 POWDER, FOR SOLUTION ORAL DAILY
Qty: 0 | Refills: 0 | Status: DISCONTINUED | OUTPATIENT
Start: 2018-09-30 | End: 2018-10-01

## 2018-09-30 RX ORDER — CEFAZOLIN SODIUM 1 G
2000 VIAL (EA) INJECTION EVERY 8 HOURS
Qty: 0 | Refills: 0 | Status: COMPLETED | OUTPATIENT
Start: 2018-09-30 | End: 2018-10-01

## 2018-09-30 RX ORDER — OXYCODONE HYDROCHLORIDE 5 MG/1
5 TABLET ORAL EVERY 4 HOURS
Qty: 0 | Refills: 0 | Status: DISCONTINUED | OUTPATIENT
Start: 2018-09-30 | End: 2018-09-30

## 2018-09-30 RX ORDER — AMLODIPINE BESYLATE 2.5 MG/1
10 TABLET ORAL DAILY
Qty: 0 | Refills: 0 | Status: DISCONTINUED | OUTPATIENT
Start: 2018-09-30 | End: 2018-09-30

## 2018-09-30 RX ORDER — LANOLIN ALCOHOL/MO/W.PET/CERES
3 CREAM (GRAM) TOPICAL AT BEDTIME
Qty: 0 | Refills: 0 | Status: DISCONTINUED | OUTPATIENT
Start: 2018-09-30 | End: 2018-10-04

## 2018-09-30 RX ORDER — CEFTRIAXONE 500 MG/1
1 INJECTION, POWDER, FOR SOLUTION INTRAMUSCULAR; INTRAVENOUS EVERY 24 HOURS
Qty: 0 | Refills: 0 | Status: DISCONTINUED | OUTPATIENT
Start: 2018-09-30 | End: 2018-09-30

## 2018-09-30 RX ORDER — MORPHINE SULFATE 50 MG/1
1 CAPSULE, EXTENDED RELEASE ORAL
Qty: 0 | Refills: 0 | Status: DISCONTINUED | OUTPATIENT
Start: 2018-09-30 | End: 2018-10-04

## 2018-09-30 RX ORDER — INFLUENZA VIRUS VACCINE 15; 15; 15; 15 UG/.5ML; UG/.5ML; UG/.5ML; UG/.5ML
0.5 SUSPENSION INTRAMUSCULAR ONCE
Qty: 0 | Refills: 0 | Status: DISCONTINUED | OUTPATIENT
Start: 2018-09-30 | End: 2018-10-04

## 2018-09-30 RX ORDER — LISINOPRIL 2.5 MG/1
40 TABLET ORAL DAILY
Qty: 0 | Refills: 0 | Status: DISCONTINUED | OUTPATIENT
Start: 2018-09-30 | End: 2018-10-01

## 2018-09-30 RX ORDER — ATENOLOL 25 MG/1
50 TABLET ORAL DAILY
Qty: 0 | Refills: 0 | Status: DISCONTINUED | OUTPATIENT
Start: 2018-09-30 | End: 2018-10-04

## 2018-09-30 RX ORDER — LISINOPRIL 2.5 MG/1
40 TABLET ORAL DAILY
Qty: 0 | Refills: 0 | Status: DISCONTINUED | OUTPATIENT
Start: 2018-09-30 | End: 2018-09-30

## 2018-09-30 RX ORDER — ONDANSETRON 8 MG/1
4 TABLET, FILM COATED ORAL ONCE
Qty: 0 | Refills: 0 | Status: DISCONTINUED | OUTPATIENT
Start: 2018-09-30 | End: 2018-09-30

## 2018-09-30 RX ORDER — POLYETHYLENE GLYCOL 3350 17 G/17G
17 POWDER, FOR SOLUTION ORAL DAILY
Qty: 0 | Refills: 0 | Status: DISCONTINUED | OUTPATIENT
Start: 2018-09-30 | End: 2018-09-30

## 2018-09-30 RX ORDER — ASPIRIN/CALCIUM CARB/MAGNESIUM 324 MG
81 TABLET ORAL
Qty: 0 | Refills: 0 | Status: DISCONTINUED | OUTPATIENT
Start: 2018-09-30 | End: 2018-10-04

## 2018-09-30 RX ORDER — LISINOPRIL 2.5 MG/1
1 TABLET ORAL
Qty: 0 | Refills: 0 | COMMUNITY

## 2018-09-30 RX ORDER — ONDANSETRON 8 MG/1
4 TABLET, FILM COATED ORAL EVERY 6 HOURS
Qty: 0 | Refills: 0 | Status: DISCONTINUED | OUTPATIENT
Start: 2018-09-30 | End: 2018-09-30

## 2018-09-30 RX ORDER — SODIUM CHLORIDE 9 MG/ML
1000 INJECTION INTRAMUSCULAR; INTRAVENOUS; SUBCUTANEOUS
Qty: 0 | Refills: 0 | Status: DISCONTINUED | OUTPATIENT
Start: 2018-09-30 | End: 2018-09-30

## 2018-09-30 RX ORDER — DOCUSATE SODIUM 100 MG
100 CAPSULE ORAL THREE TIMES A DAY
Qty: 0 | Refills: 0 | Status: DISCONTINUED | OUTPATIENT
Start: 2018-09-30 | End: 2018-10-01

## 2018-09-30 RX ORDER — CHLORHEXIDINE GLUCONATE 213 G/1000ML
1 SOLUTION TOPICAL ONCE
Qty: 0 | Refills: 0 | Status: COMPLETED | OUTPATIENT
Start: 2018-09-30 | End: 2018-09-30

## 2018-09-30 RX ORDER — FENTANYL CITRATE 50 UG/ML
50 INJECTION INTRAVENOUS ONCE
Qty: 0 | Refills: 0 | Status: DISCONTINUED | OUTPATIENT
Start: 2018-09-30 | End: 2018-09-30

## 2018-09-30 RX ADMIN — Medication 400 MILLIGRAM(S): at 01:58

## 2018-09-30 RX ADMIN — SENNA PLUS 2 TABLET(S): 8.6 TABLET ORAL at 21:48

## 2018-09-30 RX ADMIN — Medication 5 MILLIGRAM(S): at 12:04

## 2018-09-30 RX ADMIN — Medication 100 MILLIGRAM(S): at 21:48

## 2018-09-30 RX ADMIN — SODIUM CHLORIDE 75 MILLILITER(S): 9 INJECTION, SOLUTION INTRAVENOUS at 17:37

## 2018-09-30 RX ADMIN — Medication 200 MILLIGRAM(S): at 16:47

## 2018-09-30 RX ADMIN — CEFTRIAXONE 100 GRAM(S): 500 INJECTION, POWDER, FOR SOLUTION INTRAMUSCULAR; INTRAVENOUS at 10:58

## 2018-09-30 RX ADMIN — ATENOLOL 50 MILLIGRAM(S): 25 TABLET ORAL at 05:22

## 2018-09-30 RX ADMIN — OXYCODONE HYDROCHLORIDE 5 MILLIGRAM(S): 5 TABLET ORAL at 05:22

## 2018-09-30 RX ADMIN — CHLORHEXIDINE GLUCONATE 1 APPLICATION(S): 213 SOLUTION TOPICAL at 09:55

## 2018-09-30 RX ADMIN — Medication 100 MILLIGRAM(S): at 12:04

## 2018-09-30 RX ADMIN — HYDROMORPHONE HYDROCHLORIDE 0.25 MILLIGRAM(S): 2 INJECTION INTRAMUSCULAR; INTRAVENOUS; SUBCUTANEOUS at 16:45

## 2018-09-30 RX ADMIN — OXYCODONE HYDROCHLORIDE 5 MILLIGRAM(S): 5 TABLET ORAL at 22:12

## 2018-09-30 RX ADMIN — Medication 81 MILLIGRAM(S): at 21:48

## 2018-09-30 RX ADMIN — OXYCODONE HYDROCHLORIDE 5 MILLIGRAM(S): 5 TABLET ORAL at 22:42

## 2018-09-30 RX ADMIN — OXYCODONE HYDROCHLORIDE 5 MILLIGRAM(S): 5 TABLET ORAL at 05:52

## 2018-09-30 RX ADMIN — FENTANYL CITRATE 50 MICROGRAM(S): 50 INJECTION INTRAVENOUS at 01:35

## 2018-09-30 RX ADMIN — SODIUM CHLORIDE 50 MILLILITER(S): 9 INJECTION INTRAMUSCULAR; INTRAVENOUS; SUBCUTANEOUS at 03:31

## 2018-09-30 NOTE — H&P ADULT - ASSESSMENT
A/P: 89F w/ R Hip Fx  Pain control  NWB RLE  FU labs  FU UA  D/w pt and family need for orthopedic surgical intervention  All pt questions answered  Medical and cardiology clearance/optimization for OR  NPO for OR  IVF while NPO  Hold all chemical DVT ppx  Plan OR 9/30

## 2018-09-30 NOTE — H&P ADULT - NSHPREVIEWOFSYSTEMS_GEN_ALL_CORE
CONSTITUTIONAL: No weakness, fevers or chills  EYES/ENT: No visual changes  NECK: No pain or stiffness  RESPIRATORY: No cough, wheezing,   CARDIOVASCULAR: No chest pain or palpitations  GASTROINTESTINAL: No nausea, vomiting, or hematemesis; No diarrhea or constipation.   GENITOURINARY: No dysuria, frequency or hematuria  NEUROLOGICAL: No numbness or weakness

## 2018-09-30 NOTE — H&P ADULT - NSHPLABSRESULTS_GEN_ALL_CORE
11.9   14.8  )-----------( 226      ( 29 Sep 2018 23:49 )             36.7     09-29    131<L>  |  96  |  30<H>  ----------------------------<  138<H>  4.1   |  24  |  1.16    Ca    8.3<L>      29 Sep 2018 23:49    TPro  5.9<L>  /  Alb  3.0<L>  /  TBili  0.3  /  DBili  x   /  AST  29  /  ALT  24  /  AlkPhos  88  09-29    PT/INR - ( 29 Sep 2018 23:49 )   PT: 10.9 sec;   INR: 1.01 ratio      PTT - ( 29 Sep 2018 23:49 )  PTT:28.9 sec     XR Pelvis and R Hip:   intertrochanteric Fx R femur    CT Head: Neg bleed

## 2018-09-30 NOTE — CONSULT NOTE ADULT - ASSESSMENT
pt with hx of , s/p ppm BIOTRONIC , s/p fall.  awaiting echo  may consider under light sedation  check ppm  discussed with daughter understand pt is a high risk candidate  f/u bp closely  dvt prophylaxis

## 2018-09-30 NOTE — H&P ADULT - NSHPPHYSICALEXAM_GEN_ALL_CORE
Physical Exam    Vital Signs Last 24 Hrs  T(C): 36.4 (30 Sep 2018 03:09), Max: 36.9 (30 Sep 2018 01:39)  T(F): 97.5 (30 Sep 2018 03:09), Max: 98.4 (30 Sep 2018 01:39)  HR: 71 (30 Sep 2018 03:09) (60 - 71)  BP: 126/65 (30 Sep 2018 03:09) (120/69 - 136/74)  RR: 18 (30 Sep 2018 03:09) (18 - 20)  SpO2: 100% (30 Sep 2018 03:09) (97% - 100%)    General: NAD, Alert, Awake and oriented  Neurologic: No gross deficits, moving all 4s.  Head: NCAT without abrasions, lacerations, or ecchymosis to head, face, or scalp  Neck/C-Spine: FAROM. No bony TTP.    RIGHT LE: No open skin. Externally Rotated and shortened. No deformities or other signs of trauma at  knee, lower leg, ankle or foot. Full baseline painless ROM at ankle and toes with.    SILT L3-S1.  DP/PT pulses with brisk cap refill distally.   Compartments soft and compressible.   mild pitting edema RLE w/ minor skin break down distal tibia region    Secondary Survey: No TTP over bony prominences, SILT, palpable pulses, full/painless range of motion, compartments soft

## 2018-09-30 NOTE — CONSULT NOTE ADULT - SUBJECTIVE AND OBJECTIVE BOX
HPI:   Patient is a 89y female with recent ppm insertion who had a fall with resultant right hip fx. She has now undergone orif of the hip under cefazolin surgical site prophylaxis. She denies any fever, chills, abdomen pain, flank pain, dysuria or hematuria prior to admit. She takes medication for overactive bladder and has frequency but that is not new. She has not had any recent uti treatment. No cough, n,v,d,sob,cp, ha.    REVIEW OF SYSTEMS:  All other review of systems negative (Comprehensive ROS)    PAST MEDICAL & SURGICAL HISTORY:  Gout  Congestive heart failure  Hypertension  Artificial cardiac pacemaker  S/P cholecystectomy  H/O total hysterectomy      Allergies    No Known Allergies    Intolerances        Antimicrobials Day #  :1  ceFAZolin   IVPB 2000 milliGRAM(s) IV Intermittent every 8 hours    Other Medications:  allopurinol 100 milliGRAM(s) Oral daily  amLODIPine   Tablet 10 milliGRAM(s) Oral daily  aspirin enteric coated 81 milliGRAM(s) Oral two times a day  ATENolol  Tablet 50 milliGRAM(s) Oral daily  docusate sodium 100 milliGRAM(s) Oral three times a day  influenza   Vaccine 0.5 milliLiter(s) IntraMuscular once  lactated ringers. 1000 milliLiter(s) IV Continuous <Continuous>  lisinopril 40 milliGRAM(s) Oral daily  magnesium hydroxide Suspension 30 milliLiter(s) Oral daily PRN  melatonin 3 milliGRAM(s) Oral at bedtime PRN  morphine  - Injectable 1 milliGRAM(s) IV Push every 2 hours PRN  oxybutynin 5 milliGRAM(s) Oral daily  oxyCODONE    IR 2.5 milliGRAM(s) Oral every 4 hours PRN  oxyCODONE    IR 5 milliGRAM(s) Oral every 4 hours PRN  polyethylene glycol 3350 17 Gram(s) Oral daily  senna 2 Tablet(s) Oral at bedtime      FAMILY HISTORY:  No pertinent family history in first degree relatives      SOCIAL HISTORY:  Smoking: [ ]Yes [x ]No  ETOH: [ ]Yes [x ]No  Drug Use: [ ]Yes [ x]No   [x ] Single[ ]    T(F): 96.8 (18 @ 17:30), Max: 98.4 (18 @ 01:39)  HR: 60 (18 @ 17:40)  BP: 108/55 (18 @ 17:40)  RR: 16 (18 @ 17:40)  SpO2: 93% (18 @ 17:40)  Wt(kg): --    PHYSICAL EXAM:  General: sleepy but can talk no acute distress  Eyes:  anicteric, no conjunctival injection, no discharge  Oropharynx: no lesions or injection 	  Neck: supple, without adenopathy  Lungs: clear to auscultation  Heart: s1s2 3/6 sys m  Abdomen: soft, nondistended, nontender, without mass or organomegaly  Skin: no lesions  Extremities: no clubbing, cyanosis, or edema. right hip dressed  Neurologic:sleepy but follows commands,  moves all extremities    LAB RESULTS:                        9.0    16.0  )-----------( 176      ( 30 Sep 2018 16:15 )             26.8         132<L>  |  99  |  32<H>  ----------------------------<  115<H>  4.4   |  22  |  0.99    Ca    7.6<L>      30 Sep 2018 16:14    TPro  5.9<L>  /  Alb  3.0<L>  /  TBili  0.3  /  DBili  x   /  AST  29  /  ALT  24  /  AlkPhos  88      LIVER FUNCTIONS - ( 29 Sep 2018 23:49 )  Alb: 3.0 g/dL / Pro: 5.9 g/dL / ALK PHOS: 88 U/L / ALT: 24 U/L / AST: 29 U/L / GGT: x           Urinalysis Basic - ( 30 Sep 2018 07:50 )    Color: Yellow / Appearance: Slightly Turbid / S.029 / pH: x  Gluc: x / Ketone: Negative  / Bili: Negative / Urobili: 3 mg/dL   Blood: x / Protein: 30 mg/dL / Nitrite: Negative   Leuk Esterase: Large / RBC: 15 /hpf /  /hpf   Sq Epi: x / Non Sq Epi: 11 /hpf / Bacteria: Moderate        MICROBIOLOGY:  RECENT CULTURES:        RADIOLOGY REVIEWED:  < from: Xray Femur 2 Views, Right (18 @ 00:48) >  IMPRESSION:     Acute intertrochanteric right hip fracture with superior displacement and   external rotation of the distal fracture fragment. No displaced pelvic   fracture. Arthrosis of the bilateral hips, pubic symphysis, and right   knee is noted. The knee joint is intact. Atherosclerotic calcifications   are noted.   	  < from: Xray Chest 1 View- PORTABLE-Urgent (18 @ 00:47) >    IMPRESSION:    Trace left pleural effusion.    < end of copied text >    < end of copied text >          Impression:    patient s/p fall, right hip fx orif , some pyuria on ua but also has sq epi, no gu symptoms, got a dose of ceftriaxone and on ancef periop. see no indication for additional antibiotics or evaluation. Elevate wbc reactive to fracture.       Recommendations:  finish surgical site prophylaxis per ortho protocol  would culture urine only if she gets symptomatic

## 2018-09-30 NOTE — BRIEF OPERATIVE NOTE - PROCEDURE
<<-----Click on this checkbox to enter Procedure Intramedullary nailing for fracture of femur  09/30/2018    Active  TDOERING

## 2018-09-30 NOTE — PROGRESS NOTE ADULT - ASSESSMENT
89 y/o FM with right IT hip fracture for operative fixation today, NPO, no narcs, f/u clearance  Merlene Negrete PA-C  Orthopaedic Surgery  Team pager [phone]  [facility] [phone]  [phone]

## 2018-09-30 NOTE — CONSULT NOTE ADULT - SUBJECTIVE AND OBJECTIVE BOX
89F community ambulatory w/ and w/o walker assistance    presents c/o R hip pain and inability to ambulate sp witnessed mechanical fall  .Family present a bedside endorse HS/LOC.   Denies numbness/tingling. Denies fever/chills.   Pacemaker recently inserted at [facility] 18.     REVIEW OF SYSTEMS:  GEN: no fever,    no chills  RESP: no SOB,   no cough  CVS: no chest pain,   no palpitations  GI: no abdominal pain,   no nausea,   no vomiting,   no constipation,   no diarrhea  : no dysuria,   no frequency  NEURO: no headache,   no dizziness  PSYCH: no depression,   not anxious  Derm : no rash    Allergies    No Known Allergies    Intolerances        CAPILLARY BLOOD GLUCOSE        I&O's Summary    29 Sep 2018 07:01  -  30 Sep 2018 07:00  --------------------------------------------------------  IN: 200 mL / OUT: 0 mL / NET: 200 mL        Vital Signs Last 24 Hrs  T(C): 36.4 (30 Sep 2018 03:09), Max: 36.9 (30 Sep 2018 01:39)  T(F): 97.5 (30 Sep 2018 03:09), Max: 98.4 (30 Sep 2018 01:39)  HR: 60 (30 Sep 2018 06:59) (60 - 71)  BP: 119/69 (30 Sep 2018 06:59) (119/69 - 136/74)  BP(mean): --  RR: 18 (30 Sep 2018 05:13) (18 - 20)  SpO2: 99% (30 Sep 2018 05:13) (97% - 100%)  PHYSICAL EXAM:  GENERAL: NAD,   HEAD:  Atraumatic, Normocephalic  EYES: EOMI,  NECK: Supple, No JVD  CHEST/LUNG: Clear to auscultation bilaterally; No wheeze  HEART: Regular rate and rhythm;        murmur  ABDOMEN: Soft, Nontender,   EXTREMITIES:     no    edema  NEUROLOGY:  alert    MEDICATIONS  (STANDING):  allopurinol 100 milliGRAM(s) Oral daily  amLODIPine   Tablet 10 milliGRAM(s) Oral daily  ATENolol  Tablet 50 milliGRAM(s) Oral daily  influenza   Vaccine 0.5 milliLiter(s) IntraMuscular once  lisinopril 40 milliGRAM(s) Oral daily  oxybutynin 5 milliGRAM(s) Oral daily  pantoprazole    Tablet 40 milliGRAM(s) Oral before breakfast  polyethylene glycol 3350 17 Gram(s) Oral daily  sodium chloride 0.9%. 1000 milliLiter(s) (50 mL/Hr) IV Continuous <Continuous>    MEDICATIONS  (PRN):  acetaminophen   Tablet .. 650 milliGRAM(s) Oral every 6 hours PRN Temp greater or equal to 38C (100.4F)  HYDROmorphone  Injectable 0.5 milliGRAM(s) IV Push every 3 hours PRN Severe Pain (7 - 10)  ondansetron Injectable 4 milliGRAM(s) IV Push every 6 hours PRN Nausea  oxyCODONE    IR 5 milliGRAM(s) Oral every 4 hours PRN Moderate Pain (4 - 6)    LABS:                        10.7   20.1  )-----------( 206      ( 30 Sep 2018 04:21 )             32.5     -    131<L>  |  96  |  32<H>  ----------------------------<  112<H>  4.1   |  23  |  1.16    Ca    8.3<L>      30 Sep 2018 04:21    TPro  5.9<L>  /  Alb  3.0<L>  /  TBili  0.3  /  DBili  x   /  AST  29  /  ALT  24  /  AlkPhos  88  09-    PT/INR - ( 30 Sep 2018 04:21 )   PT: 10.8 sec;   INR: 1.00 ratio         PTT - ( 30 Sep 2018 04:21 )  PTT:29.2 sec      Urinalysis Basic - ( 30 Sep 2018 07:50 )    Color: Yellow / Appearance: Slightly Turbid / S.029 / pH: x  Gluc: x / Ketone: Negative  / Bili: Negative / Urobili: 3 mg/dL   Blood: x / Protein: 30 mg/dL / Nitrite: Negative   Leuk Esterase: Large / RBC: x / WBC x   Sq Epi: x / Non Sq Epi: x / Bacteria: x                      Consultant(s) Notes Reviewed:      Care Discussed with Consultants/Other Providers:  Plan: 89F community ambulatory w/ and w/o walker assistance    presents c/o R hip pain and inability to ambulate sp witnessed mechanical fall  .Family present a bedside endorse HS/LOC.   Denies numbness/tingling. Denies fever/chills.   Pacemaker recently inserted at [facility] 18.     REVIEW OF SYSTEMS:  GEN: no fever,    no chills  RESP: no SOB,   no cough  CVS: no chest pain,   no palpitations  GI: no abdominal pain,   no nausea,   no vomiting,   no constipation,   no diarrhea  : no dysuria,   no frequency  NEURO: no headache,   no dizziness  PSYCH: no depression,   not anxious  Derm : no rash    Allergies    No Known Allergies    Intolerances        CAPILLARY BLOOD GLUCOSE        I&O's Summary    29 Sep 2018 07:01  -  30 Sep 2018 07:00  --------------------------------------------------------  IN: 200 mL / OUT: 0 mL / NET: 200 mL        Vital Signs Last 24 Hrs  T(C): 36.4 (30 Sep 2018 03:09), Max: 36.9 (30 Sep 2018 01:39)  T(F): 97.5 (30 Sep 2018 03:09), Max: 98.4 (30 Sep 2018 01:39)  HR: 60 (30 Sep 2018 06:59) (60 - 71)  BP: 119/69 (30 Sep 2018 06:59) (119/69 - 136/74)  BP(mean): --  RR: 18 (30 Sep 2018 05:13) (18 - 20)  SpO2: 99% (30 Sep 2018 05:13) (97% - 100%)  PHYSICAL EXAM:  GENERAL: NAD,   HEAD:  Atraumatic, Normocephalic  EYES: EOMI,  NECK: Supple, No JVD  CHEST/LUNG: Clear to auscultation bilaterally; No wheeze  HEART: Regular rate and rhythm;        murmur  ABDOMEN: Soft, Nontender,   EXTREMITIES:     b/l    edema  NEUROLOGY:  alert    MEDICATIONS  (STANDING):  allopurinol 100 milliGRAM(s) Oral daily  amLODIPine   Tablet 10 milliGRAM(s) Oral daily  ATENolol  Tablet 50 milliGRAM(s) Oral daily  influenza   Vaccine 0.5 milliLiter(s) IntraMuscular once  lisinopril 40 milliGRAM(s) Oral daily  oxybutynin 5 milliGRAM(s) Oral daily  pantoprazole    Tablet 40 milliGRAM(s) Oral before breakfast  polyethylene glycol 3350 17 Gram(s) Oral daily  sodium chloride 0.9%. 1000 milliLiter(s) (50 mL/Hr) IV Continuous <Continuous>    MEDICATIONS  (PRN):  acetaminophen   Tablet .. 650 milliGRAM(s) Oral every 6 hours PRN Temp greater or equal to 38C (100.4F)  HYDROmorphone  Injectable 0.5 milliGRAM(s) IV Push every 3 hours PRN Severe Pain (7 - 10)  ondansetron Injectable 4 milliGRAM(s) IV Push every 6 hours PRN Nausea  oxyCODONE    IR 5 milliGRAM(s) Oral every 4 hours PRN Moderate Pain (4 - 6)    LABS:                        10.7   20.1  )-----------( 206      ( 30 Sep 2018 04:21 )             32.5     -    131<L>  |  96  |  32<H>  ----------------------------<  112<H>  4.1   |  23  |  1.16    Ca    8.3<L>      30 Sep 2018 04:21    TPro  5.9<L>  /  Alb  3.0<L>  /  TBili  0.3  /  DBili  x   /  AST  29  /  ALT  24  /  AlkPhos  88  09-    PT/INR - ( 30 Sep 2018 04:21 )   PT: 10.8 sec;   INR: 1.00 ratio         PTT - ( 30 Sep 2018 04:21 )  PTT:29.2 sec      Urinalysis Basic - ( 30 Sep 2018 07:50 )    Color: Yellow / Appearance: Slightly Turbid / S.029 / pH: x  Gluc: x / Ketone: Negative  / Bili: Negative / Urobili: 3 mg/dL   Blood: x / Protein: 30 mg/dL / Nitrite: Negative   Leuk Esterase: Large / RBC: x / WBC x   Sq Epi: x / Non Sq Epi: x / Bacteria: x                      Consultant(s) Notes Reviewed:      Care Discussed with Consultants/Other Providers:  Plan:

## 2018-09-30 NOTE — H&P ADULT - HISTORY OF PRESENT ILLNESS
89F community ambulatory w/ and w/o walker assitancepresents c/o R hip pain and inability to ambulate sp witnessed mechanical fall. Family present a bedside endorse HS/LOC. Denies numbness/tingling. Denies fever/chills. Denies pain/injury elsewhere. Denies orthopedic Hx and does not follow-up w/ an orthopedic doctor. Pacemaker recently inserted at [facility] 7/27/18.

## 2018-09-30 NOTE — CONSULT NOTE ADULT - SUBJECTIVE AND OBJECTIVE BOX
CHIEF COMPLAINT:Patient is a 89y old  Female who presents with a chief complaint of R hip Fx .      HPI:  89F community ambulatory w/ and w/o walker assitancepresents c/o R hip pain and inability to ambulate sp witnessed mechanical fall. Family present a bedside endorse HS/LOC. Denies numbness/tingling. Denies fever/chills. Denies pain/injury elsewhere. Denies orthopedic Hx and does not follow-up w/ an orthopedic doctor. Pacemaker recently inserted at [facility] 7/27/18. 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  as per hx by family pt has no cardiac complain, chest pain, sob nor hx of CHF.    PAST MEDICAL & SURGICAL HISTORY:  Gout  Congestive heart failure  Hypertension  Artificial cardiac pacemaker  S/P cholecystectomy  H/O total hysterectomy      MEDICATIONS  (STANDING):  allopurinol 100 milliGRAM(s) Oral daily  amLODIPine   Tablet 10 milliGRAM(s) Oral daily  ATENolol  Tablet 50 milliGRAM(s) Oral daily  chlorhexidine 4% Liquid 1 Application(s) Topical once  influenza   Vaccine 0.5 milliLiter(s) IntraMuscular once  lisinopril 40 milliGRAM(s) Oral daily  oxybutynin 5 milliGRAM(s) Oral daily  pantoprazole    Tablet 40 milliGRAM(s) Oral before breakfast  polyethylene glycol 3350 17 Gram(s) Oral daily  sodium chloride 0.9%. 1000 milliLiter(s) (50 mL/Hr) IV Continuous <Continuous>    MEDICATIONS  (PRN):  acetaminophen   Tablet .. 650 milliGRAM(s) Oral every 6 hours PRN Temp greater or equal to 38C (100.4F)  HYDROmorphone  Injectable 0.5 milliGRAM(s) IV Push every 3 hours PRN Severe Pain (7 - 10)  ondansetron Injectable 4 milliGRAM(s) IV Push every 6 hours PRN Nausea  oxyCODONE    IR 5 milliGRAM(s) Oral every 4 hours PRN Moderate Pain (4 - 6)      FAMILY HISTORY:  No pertinent family history in first degree relatives      SOCIAL HISTORY:    [ ] Non-smoker  [ ] Smoker  [ ] Alcohol    Allergies    No Known Allergies    Intolerances    	    REVIEW OF SYSTEMS:  CONSTITUTIONAL: No fever, weight loss, or fatigue  EYES: No eye pain, visual disturbances, or discharge  ENT:  No difficulty hearing, tinnitus, vertigo; No sinus or throat pain  NECK: No pain or stiffness  RESPIRATORY: No cough, wheezing, chills or hemoptysis; No Shortness of Breath  CARDIOVASCULAR: No chest pain, palpitations, passing out, dizziness, or leg swelling  GASTROINTESTINAL: No abdominal or epigastric pain. No nausea, vomiting, or hematemesis; No diarrhea or constipation. No melena or hematochezia.  GENITOURINARY: No dysuria, frequency, hematuria, or incontinence  NEUROLOGICAL: No headaches, memory loss, loss of strength, numbness, or tremors  SKIN: No itching, burning, rashes, or lesions   LYMPH Nodes: No enlarged glands  ENDOCRINE: No heat or cold intolerance; No hair loss  MUSCULOSKELETAL: No joint pain or swelling; No muscle, back, or extremity pain  PSYCHIATRIC: No depression, anxiety, mood swings, or difficulty sleeping  HEME/LYMPH: No easy bruising, or bleeding gums  ALLERGY AND IMMUNOLOGIC: No hives or eczema	    [ ] All others negative	  [ ] Unable to obtain    PHYSICAL EXAM:  T(C): 36.2 (09-30-18 @ 08:35), Max: 36.9 (09-30-18 @ 01:39)  HR: 57 (09-30-18 @ 08:35) (57 - 71)  BP: 115/70 (09-30-18 @ 08:35) (115/70 - 136/74)  RR: 18 (09-30-18 @ 08:35) (18 - 20)  SpO2: 98% (09-30-18 @ 08:35) (97% - 100%)  Wt(kg): --  I&O's Summary    29 Sep 2018 07:01  -  30 Sep 2018 07:00  --------------------------------------------------------  IN: 200 mL / OUT: 0 mL / NET: 200 mL    30 Sep 2018 07:01  -  30 Sep 2018 09:34  --------------------------------------------------------  IN: 0 mL / OUT: 150 mL / NET: -150 mL        Appearance: Normal	  HEENT:   Normal oral mucosa, PERRL, EOMI	  Lymphatic: No lymphadenopathy  Cardiovascular: Normal S1 S2, No JVD, No murmurs, No edema  Respiratory: rhonchi  Psychiatry: A & O x 3, Mood & affect appropriate  Gastrointestinal:  Soft, Non-tender, + BS	  Skin: No rashes, No ecchymoses, No cyanosis	  Neurologic: Non-focal  Extremities: Normal range of motion, No clubbing, cyanosis or edema  Vascular: Peripheral pulses palpable 2+ bilaterally    TELEMETRY: 	    ECG:  	  RADIOLOGY:  OTHER: 	  	  LABS:	 	    CARDIAC MARKERS:                              10.7   20.1  )-----------( 206      ( 30 Sep 2018 04:21 )             32.5     09-30    131<L>  |  96  |  32<H>  ----------------------------<  112<H>  4.1   |  23  |  1.16    Ca    8.3<L>      30 Sep 2018 04:21    TPro  5.9<L>  /  Alb  3.0<L>  /  TBili  0.3  /  DBili  x   /  AST  29  /  ALT  24  /  AlkPhos  88  09-29    proBNP:   Lipid Profile:   HgA1c:   TSH:   PT/INR - ( 30 Sep 2018 04:21 )   PT: 10.8 sec;   INR: 1.00 ratio         PTT - ( 30 Sep 2018 04:21 )  PTT:29.2 sec    PREVIOUS DIAGNOSTIC TESTING:    < from: 12 Lead ECG (09.30.18 @ 00:51) >   AV dual-paced rhythm WITH OCCASIONAL ventricular-paced complexes  ABNORMAL ECG

## 2018-09-30 NOTE — CONSULT NOTE ADULT - ASSESSMENT
A/P: 89F w/  mechanical  fall, now  with    R Hip Fx  h/o  inguinal hernia/  recent PPM/ HTN  On asa/  atenolol/ norvasc/ lisinopril   ,at home  Pain control  NWB RLE  ortho has discussed with  pt and family,  need for  surgical intervention  NPO for OR  IVF while NPO  Hold all chemical DVT ppx  Plan OR 9/30. pe r ortho  ehg. paced   elevated wbc  of 20, 00 noted, coud   be reactive to fall/ stress/ pt is afebrile.    will follow  u/a, and then consider need  for rocephin, at least 1  dose, prior  to or  stephania/ dr calderon A/P: 89F w/  mechanical  fall, now  with    R Hip Fx  h/o  inguinal hernia/  recent PPM/ [employer]/ 7/18,  HTN  On asa/  atenolol/ norvasc/ lisinopril   ,at home  Pain control  NWB MARCELINO  ortho has discussed with  pt and family,  need for  surgical intervention  NPO for OR  IVF while NPO  Hold all chemical DVT ppx  Plan OR 9/30. pe r ortho  ehg. paced   elevated wbc  of 20, 00 noted, coud   be reactive to fall/ stress/ pt is afebrile.    will follow  u/a, and then consider need  for rocephin, at least 1  dose, prior  to or  echo  will be done  this  am  and  then, pt cleared   at high risk/ family  aware  card/ dr calderon

## 2018-09-30 NOTE — PROGRESS NOTE ADULT - ASSESSMENT
A/P: 89 F a/w F IT Fx    RLE NWB  Bedrest  Awaiting Med and Cardio consult  NPO  IVF  Pain management prn   To OR for R IM Nail pending clearance      NAREN Frey  Orthopedic Surgery  [phone]

## 2018-10-01 LAB
ANION GAP SERPL CALC-SCNC: 14 MMOL/L — SIGNIFICANT CHANGE UP (ref 5–17)
BASOPHILS # BLD AUTO: 0 K/UL — SIGNIFICANT CHANGE UP (ref 0–0.2)
BASOPHILS NFR BLD AUTO: 0 % — SIGNIFICANT CHANGE UP (ref 0–2)
BUN SERPL-MCNC: 41 MG/DL — HIGH (ref 7–23)
CALCIUM SERPL-MCNC: 8.5 MG/DL — SIGNIFICANT CHANGE UP (ref 8.4–10.5)
CHLORIDE SERPL-SCNC: 94 MMOL/L — LOW (ref 96–108)
CO2 SERPL-SCNC: 21 MMOL/L — LOW (ref 22–31)
CREAT SERPL-MCNC: 1.34 MG/DL — HIGH (ref 0.5–1.3)
EOSINOPHIL # BLD AUTO: 0 K/UL — SIGNIFICANT CHANGE UP (ref 0–0.5)
EOSINOPHIL NFR BLD AUTO: 0 % — SIGNIFICANT CHANGE UP (ref 0–6)
GLUCOSE SERPL-MCNC: 167 MG/DL — HIGH (ref 70–99)
HCT VFR BLD CALC: 26 % — LOW (ref 34.5–45)
HGB BLD-MCNC: 8.8 G/DL — LOW (ref 11.5–15.5)
IMM GRANULOCYTES NFR BLD AUTO: 0.4 % — SIGNIFICANT CHANGE UP (ref 0–1.5)
LYMPHOCYTES # BLD AUTO: 1.27 K/UL — SIGNIFICANT CHANGE UP (ref 1–3.3)
LYMPHOCYTES # BLD AUTO: 7.8 % — LOW (ref 13–44)
MCHC RBC-ENTMCNC: 29.5 PG — SIGNIFICANT CHANGE UP (ref 27–34)
MCHC RBC-ENTMCNC: 33.8 GM/DL — SIGNIFICANT CHANGE UP (ref 32–36)
MCV RBC AUTO: 87.2 FL — SIGNIFICANT CHANGE UP (ref 80–100)
MONOCYTES # BLD AUTO: 0.78 K/UL — SIGNIFICANT CHANGE UP (ref 0–0.9)
MONOCYTES NFR BLD AUTO: 4.8 % — SIGNIFICANT CHANGE UP (ref 2–14)
NEUTROPHILS # BLD AUTO: 14.19 K/UL — HIGH (ref 1.8–7.4)
NEUTROPHILS NFR BLD AUTO: 87 % — HIGH (ref 43–77)
PLATELET # BLD AUTO: 201 K/UL — SIGNIFICANT CHANGE UP (ref 150–400)
POTASSIUM SERPL-MCNC: 4.6 MMOL/L — SIGNIFICANT CHANGE UP (ref 3.5–5.3)
POTASSIUM SERPL-SCNC: 4.6 MMOL/L — SIGNIFICANT CHANGE UP (ref 3.5–5.3)
RBC # BLD: 2.98 M/UL — LOW (ref 3.8–5.2)
RBC # FLD: 15 % — HIGH (ref 10.3–14.5)
SODIUM SERPL-SCNC: 129 MMOL/L — LOW (ref 135–145)
WBC # BLD: 16.3 K/UL — HIGH (ref 3.8–10.5)
WBC # FLD AUTO: 16.3 K/UL — HIGH (ref 3.8–10.5)

## 2018-10-01 PROCEDURE — 99222 1ST HOSP IP/OBS MODERATE 55: CPT

## 2018-10-01 RX ORDER — POLYETHYLENE GLYCOL 3350 17 G/17G
17 POWDER, FOR SOLUTION ORAL DAILY
Qty: 0 | Refills: 0 | Status: DISCONTINUED | OUTPATIENT
Start: 2018-10-01 | End: 2018-10-02

## 2018-10-01 RX ORDER — DOCUSATE SODIUM 100 MG
100 CAPSULE ORAL THREE TIMES A DAY
Qty: 0 | Refills: 0 | Status: DISCONTINUED | OUTPATIENT
Start: 2018-10-01 | End: 2018-10-04

## 2018-10-01 RX ORDER — ACETAMINOPHEN 500 MG
650 TABLET ORAL EVERY 6 HOURS
Qty: 0 | Refills: 0 | Status: DISCONTINUED | OUTPATIENT
Start: 2018-10-01 | End: 2018-10-04

## 2018-10-01 RX ORDER — ACETAMINOPHEN 500 MG
1000 TABLET ORAL ONCE
Qty: 0 | Refills: 0 | Status: COMPLETED | OUTPATIENT
Start: 2018-10-01 | End: 2018-10-01

## 2018-10-01 RX ORDER — SENNA PLUS 8.6 MG/1
2 TABLET ORAL AT BEDTIME
Qty: 0 | Refills: 0 | Status: DISCONTINUED | OUTPATIENT
Start: 2018-10-01 | End: 2018-10-04

## 2018-10-01 RX ORDER — PANTOPRAZOLE SODIUM 20 MG/1
40 TABLET, DELAYED RELEASE ORAL
Qty: 0 | Refills: 0 | Status: DISCONTINUED | OUTPATIENT
Start: 2018-10-01 | End: 2018-10-04

## 2018-10-01 RX ADMIN — Medication 100 MILLIGRAM(S): at 14:10

## 2018-10-01 RX ADMIN — Medication 5 MILLIGRAM(S): at 12:04

## 2018-10-01 RX ADMIN — Medication 81 MILLIGRAM(S): at 05:07

## 2018-10-01 RX ADMIN — Medication 100 MILLIGRAM(S): at 05:08

## 2018-10-01 RX ADMIN — LISINOPRIL 40 MILLIGRAM(S): 2.5 TABLET ORAL at 05:07

## 2018-10-01 RX ADMIN — OXYCODONE HYDROCHLORIDE 5 MILLIGRAM(S): 5 TABLET ORAL at 05:08

## 2018-10-01 RX ADMIN — Medication 1000 MILLIGRAM(S): at 03:21

## 2018-10-01 RX ADMIN — OXYCODONE HYDROCHLORIDE 2.5 MILLIGRAM(S): 5 TABLET ORAL at 22:21

## 2018-10-01 RX ADMIN — Medication 400 MILLIGRAM(S): at 03:06

## 2018-10-01 RX ADMIN — Medication 100 MILLIGRAM(S): at 12:03

## 2018-10-01 RX ADMIN — AMLODIPINE BESYLATE 10 MILLIGRAM(S): 2.5 TABLET ORAL at 05:07

## 2018-10-01 RX ADMIN — Medication 400 MILLIGRAM(S): at 23:54

## 2018-10-01 RX ADMIN — OXYCODONE HYDROCHLORIDE 5 MILLIGRAM(S): 5 TABLET ORAL at 05:38

## 2018-10-01 RX ADMIN — OXYCODONE HYDROCHLORIDE 2.5 MILLIGRAM(S): 5 TABLET ORAL at 22:50

## 2018-10-01 RX ADMIN — POLYETHYLENE GLYCOL 3350 17 GRAM(S): 17 POWDER, FOR SOLUTION ORAL at 12:04

## 2018-10-01 RX ADMIN — Medication 81 MILLIGRAM(S): at 18:00

## 2018-10-01 NOTE — PROGRESS NOTE ADULT - ASSESSMENT
89 yo female with RT hip Fx.  S/P IMN repair  Leukocytosis is likely reactive  History of overactive bladder  No clinical signs of infection prior to admission  Monitor off antibiotics, follow wbc count and exam.  Urine culture is pending, not clear if a positive result would warrant treatment.

## 2018-10-01 NOTE — CONSULT NOTE ADULT - SUBJECTIVE AND OBJECTIVE BOX
Patient is a 89y old  Female who presents with a chief complaint of R hip Fx (01 Oct 2018 12:02)    HPI:  89F community ambulatory w/ and w/o walker assitancepresents c/o R hip pain and inability to ambulate. s/p witnessed mechanical fall, patient use cane instead of walker,lost balance and fell.  son and daughter-in-law at bedside. states patient wasin good usual health and driving untill about 2-3 month ago, she was admitted to other hospital due to electrolytes problem and Pacemaker recently inserted at [facility] 18. (30 Sep 2018 02:58)      HOSPITAL COURSE: found to have R femur IT fracture. s/p IMN . pain controlled at present, no cp/sob/dizziness/n/v/abd pain. feeling well after Sx. no urinary complaints. patient states she feel guilty that she become the burden of family, feel depressed but no SI/HI     PAST MEDICAL & SURGICAL HISTORY:  Gout  Congestive heart failure  Hypertension  Artificial cardiac pacemaker  S/P cholecystectomy  H/O total hysterectomy    Allergies: No Known Allergies    FAMILY HISTORY:  No pertinent family history in first degree relatives      Social History:  use to live alone     Functional status (prior to admission):  Independent [ x] moderately [ ] fully   Dependent   [ ] moderately [ ] fully    Ambulation status  [ ] independant  [x ] cane   [ ] 2 wheeled walker  [x ] 4 wheeled walker      Review of Systems:   CONSTITUTIONAL: No fever, weight loss, or fatigue  EYES: No eye pain, visual disturbances, or discharge  ENMT:  No difficulty hearing, tinnitus, vertigo; No sinus or throat pain  NECK: No pain or stiffness  BREASTS: No pain, masses, or nipple discharge  RESPIRATORY: No cough, wheezing, chills or hemoptysis; No shortness of breath  CARDIOVASCULAR: No chest pain, palpitations, dizziness, or leg swelling  GASTROINTESTINAL: No abdominal or epigastric pain. No nausea, vomiting, or hematemesis; No diarrhea or constipation. No melena or hematochezia.  GENITOURINARY: No dysuria, frequency, hematuria, or incontinence  NEUROLOGICAL: No headaches, memory loss, loss of strength, numbness, or tremors  SKIN: No itching, burning, rashes, or lesions   LYMPH NODES: No enlarged glands  ENDOCRINE: No heat or cold intolerance; No hair loss  MUSCULOSKELETAL: see HPI  PSYCHIATRIC: No depression, anxiety, mood swings, or difficulty sleeping  HEME/LYMPH: No easy bruising, or bleeding gums  [x ] All other review of systems negative     MEDICATIONS  (STANDING):  allopurinol 100 milliGRAM(s) Oral daily  amLODIPine   Tablet 10 milliGRAM(s) Oral daily  aspirin enteric coated 81 milliGRAM(s) Oral two times a day  ATENolol  Tablet 50 milliGRAM(s) Oral daily  influenza   Vaccine 0.5 milliLiter(s) IntraMuscular once  lactated ringers. 1000 milliLiter(s) (30 mL/Hr) IV Continuous <Continuous>  oxybutynin 5 milliGRAM(s) Oral daily  pantoprazole    Tablet 40 milliGRAM(s) Oral before breakfast    MEDICATIONS  (PRN):  acetaminophen   Tablet .. 650 milliGRAM(s) Oral every 6 hours PRN Temp greater or equal to 38C (100.4F), Mild Pain (1 - 3)  docusate sodium 100 milliGRAM(s) Oral three times a day PRN Constipation  magnesium hydroxide Suspension 30 milliLiter(s) Oral daily PRN Constipation  melatonin 3 milliGRAM(s) Oral at bedtime PRN Insomnia  morphine  - Injectable 1 milliGRAM(s) IV Push every 2 hours PRN Severe Pain (7 - 10)  oxyCODONE    IR 2.5 milliGRAM(s) Oral every 4 hours PRN Moderate Pain (4 - 6)  oxyCODONE    IR 5 milliGRAM(s) Oral every 4 hours PRN Severe Pain (7 - 10)  polyethylene glycol 3350 17 Gram(s) Oral daily PRN Constipation  senna 2 Tablet(s) Oral at bedtime PRN Constipation      Vital Signs Last 24 Hrs  T(C): 36.3 (01 Oct 2018 21:58), Max: 36.8 (01 Oct 2018 08:00)  T(F): 97.4 (01 Oct 2018 21:58), Max: 98.2 (01 Oct 2018 08:00)  HR: 68 (01 Oct 2018 21:58) (60 - 73)  BP: 117/59 (01 Oct 2018 21:58) (102/62 - 133/66)  BP(mean): --  RR: 18 (01 Oct 2018 21:58) (16 - 18)  SpO2: 94% (01 Oct 2018 21:58) (94% - 98%)  CAPILLARY BLOOD GLUCOSE        I&O's Summary    30 Sep 2018 07:01  -  01 Oct 2018 07:00  --------------------------------------------------------  IN: 385 mL / OUT: 350 mL / NET: 35 mL    01 Oct 2018 07:  -  01 Oct 2018 22:50  --------------------------------------------------------  IN: 800 mL / OUT: 300 mL / NET: 500 mL        PHYSICAL EXAM:  GENERAL: NAD, well-developed  HEAD:  Atraumatic, Normocephalic  ENT: hard of hearing  EYES: EOMI, PERRLA, conjunctiva and sclera clear  NECK: Supple, No JVD  CHEST/LUNG: Clear to auscultation bilaterally; No wheeze  HEART: Regular rate and rhythm; No murmurs  ABDOMEN: Soft, Nontender, Nondistended; Bowel sounds present  EXTREMITIES:  2+ Peripheral Pulses, mild ankle edema, R hip Sx site clean  NEUROLOGY: AAOx3, non-focal  PSYCH: calm    LABS:                        8.8    16.30 )-----------( 201      ( 01 Oct 2018 07:51 )             26.0     10-    129<L>  |  94<L>  |  41<H>  ----------------------------<  167<H>  4.6   |  21<L>  |  1.34<H>    Ca    8.5      01 Oct 2018 06:42    TPro  5.9<L>  /  Alb  3.0<L>  /  TBili  0.3  /  DBili  x   /  AST  29  /  ALT  24  /  AlkPhos  88      PT/INR - ( 30 Sep 2018 04:21 )   PT: 10.8 sec;   INR: 1.00 ratio         PTT - ( 30 Sep 2018 04:21 )  PTT:29.2 sec      Urinalysis Basic - ( 30 Sep 2018 07:50 )    Color: Yellow / Appearance: Slightly Turbid / S.029 / pH: x  Gluc: x / Ketone: Negative  / Bili: Negative / Urobili: 3 mg/dL   Blood: x / Protein: 30 mg/dL / Nitrite: Negative   Leuk Esterase: Large / RBC: 15 /hpf /  /hpf   Sq Epi: x / Non Sq Epi: 11 /hpf / Bacteria: Moderate        RADIOLOGY & ADDITIONAL TESTS:    X-Ray: R Femur Fx. CXR clear, no knee fracture     US Imaging: .  echo:   Mitral annular calcification and calcified mitral  leaflets with normal diastolic opening. Minimal mitral  regurgitation.  2. Aortic valve not well visualized; appears calcified.  Peak transaortic valve gradient equals 7 mm Hg, mean  transaortic valve gradient equals 3 mm Hg, estimated aortic  valve area equals 1.8 sqcm (by continuity equation), aortic  valve velocity time integral equals 32 cm, consistent with  mild aortic stenosis. Minimal aortic regurgitation.  3. Normal left ventricular systolic function. Septal motion  consistent with paced rhythm.  4. Normal right ventricular size and function. A device  wire is noted in the right heart.      EKG Interpretation: AV dual-paced rhythm WITH OCCASIONAL ventricular-paced complexes    Imaging Personally Reviewed: right hip/femur xray    Consultant(s) Notes Reviewed:  cardiology, ID, MEDICINE    Care Discussed with Consultants/Other Providers: CARE TEAM    Care Discussed with Family: son at bedside

## 2018-10-01 NOTE — PHYSICAL THERAPY INITIAL EVALUATION ADULT - ADDITIONAL COMMENTS
Pt states she lives in [place] in a condo alone with 0 steps. Pt uses a cane for ambulation. Pt drives. Pt visiting family in [place] and currently staying with son. Pt states her son's house has 20 steps from den to kitchen. Pt states her son assists her when needed when she is in [place].

## 2018-10-01 NOTE — PHYSICAL THERAPY INITIAL EVALUATION ADULT - PERTINENT HX OF CURRENT PROBLEM, REHAB EVAL
Pt is a 89 y.o. female who is a community ambulatory w/ and w/o walker assistance presents c/o R hip pain and inability to ambulate sp witnessed mechanical fall. Family present a bedside endorse HS/LOC. (-) Head CT. +CXR 9/30/18: Trace left pleural effusion. +Trans Echo 9/30/18: Mitral annular calcification and calcified mitralleaflets with normal diastolic opening. Minimal mitral regurgitation. Aortic valve appears calcified. Mild aortic stenosis. Minimal aortic regurgitation. Continued below.

## 2018-10-01 NOTE — PHYSICAL THERAPY INITIAL EVALUATION ADULT - PRECAUTIONS/LIMITATIONS, REHAB EVAL
+R femur x-ray 9/30/18: Acute intertrochanteric right hip fracture with superior displacement and external rotation of the distal fracture fragment. No displaced pelvic fracture. Arthrosis of the bilateral hips, pubic symphysis, and right knee is noted. The knee joint is intact. Atherosclerotic calcifications are noted./fall precautions

## 2018-10-01 NOTE — PROGRESS NOTE ADULT - ASSESSMENT
A/P: 89F w/  mechanical  fall, now  with    R Hip Fx  h/o  inguinal hernia/  recent PPM/ [facility]/ 7/18,  HTN  On asa/  atenolol/ norvasc/ lisinopril   ,at home  Pain meds  ehg. paced   elevated wbc  of 20, 00 noted, coud   be reactive to fall/ stress/ pt is afebrile.    follow urine  c/s/   labs, pending A/P: 89F w/  mechanical  fall, now  with    R Hip Fx  h/o  inguinal hernia/  recent PPM/ bi[employer]/ 7/18,  HTN  On asa/  atenolol/ norvasc/ lisinopril   ,at home  Pain meds  ehg. paced   elevated wbc  of 20, 00 , now  is  16.000,  coud   be reactive to fall/ stress/ pt is afebrile.    follow urine  c/s/   hyponatremia/ SERVANDO.  hold lisinopril  hold lis  iv fluids  bmp in am

## 2018-10-01 NOTE — CONSULT NOTE ADULT - ASSESSMENT
A/P:  1. R IT fracture, s/o IMN 9/30: tolerated procedure, condition stable. ASA for DVT prophylaxis . pain controlled, PT/OT  2. HTN: norvasc, atenolol, BP controlled  3. Gout: allopurino  4. adjustment disorder: bedside counselling given  5. hyponatremia(chronic): monitor Na,   6. SERVANDO: monitor BMP, lisnopril on hold.   7. H/O CHF: no exacerbation. careful IVF

## 2018-10-02 LAB
ANION GAP SERPL CALC-SCNC: 9 MMOL/L — SIGNIFICANT CHANGE UP (ref 5–17)
BUN SERPL-MCNC: 40 MG/DL — HIGH (ref 7–23)
CALCIUM SERPL-MCNC: 8.5 MG/DL — SIGNIFICANT CHANGE UP (ref 8.4–10.5)
CHLORIDE SERPL-SCNC: 93 MMOL/L — LOW (ref 96–108)
CO2 SERPL-SCNC: 25 MMOL/L — SIGNIFICANT CHANGE UP (ref 22–31)
CREAT SERPL-MCNC: 1.13 MG/DL — SIGNIFICANT CHANGE UP (ref 0.5–1.3)
CULTURE RESULTS: SIGNIFICANT CHANGE UP
GLUCOSE SERPL-MCNC: 86 MG/DL — SIGNIFICANT CHANGE UP (ref 70–99)
HCT VFR BLD CALC: 19.1 % — CRITICAL LOW (ref 34.5–45)
HGB BLD-MCNC: 6.7 G/DL — CRITICAL LOW (ref 11.5–15.5)
MCHC RBC-ENTMCNC: 30.7 PG — SIGNIFICANT CHANGE UP (ref 27–34)
MCHC RBC-ENTMCNC: 35.1 GM/DL — SIGNIFICANT CHANGE UP (ref 32–36)
MCV RBC AUTO: 87.6 FL — SIGNIFICANT CHANGE UP (ref 80–100)
PLATELET # BLD AUTO: 206 K/UL — SIGNIFICANT CHANGE UP (ref 150–400)
POTASSIUM SERPL-MCNC: 4.5 MMOL/L — SIGNIFICANT CHANGE UP (ref 3.5–5.3)
POTASSIUM SERPL-SCNC: 4.5 MMOL/L — SIGNIFICANT CHANGE UP (ref 3.5–5.3)
RBC # BLD: 2.18 M/UL — LOW (ref 3.8–5.2)
RBC # FLD: 15.1 % — HIGH (ref 10.3–14.5)
SODIUM SERPL-SCNC: 127 MMOL/L — LOW (ref 135–145)
SPECIMEN SOURCE: SIGNIFICANT CHANGE UP
WBC # BLD: 12.61 K/UL — HIGH (ref 3.8–10.5)
WBC # FLD AUTO: 12.61 K/UL — HIGH (ref 3.8–10.5)

## 2018-10-02 RX ORDER — ACETAMINOPHEN 500 MG
1000 TABLET ORAL ONCE
Qty: 0 | Refills: 0 | Status: COMPLETED | OUTPATIENT
Start: 2018-10-02 | End: 2018-10-03

## 2018-10-02 RX ORDER — FUROSEMIDE 40 MG
20 TABLET ORAL ONCE
Qty: 0 | Refills: 0 | Status: COMPLETED | OUTPATIENT
Start: 2018-10-02 | End: 2018-10-03

## 2018-10-02 RX ORDER — ACETAMINOPHEN 500 MG
750 TABLET ORAL ONCE
Qty: 0 | Refills: 0 | Status: COMPLETED | OUTPATIENT
Start: 2018-10-02 | End: 2018-10-02

## 2018-10-02 RX ORDER — POTASSIUM CHLORIDE 20 MEQ
20 PACKET (EA) ORAL ONCE
Qty: 0 | Refills: 0 | Status: COMPLETED | OUTPATIENT
Start: 2018-10-02 | End: 2018-10-03

## 2018-10-02 RX ORDER — POLYETHYLENE GLYCOL 3350 17 G/17G
17 POWDER, FOR SOLUTION ORAL DAILY
Qty: 0 | Refills: 0 | Status: DISCONTINUED | OUTPATIENT
Start: 2018-10-02 | End: 2018-10-04

## 2018-10-02 RX ORDER — SODIUM CHLORIDE 9 MG/ML
1 INJECTION INTRAMUSCULAR; INTRAVENOUS; SUBCUTANEOUS ONCE
Qty: 0 | Refills: 0 | Status: COMPLETED | OUTPATIENT
Start: 2018-10-02 | End: 2018-10-02

## 2018-10-02 RX ADMIN — OXYCODONE HYDROCHLORIDE 5 MILLIGRAM(S): 5 TABLET ORAL at 21:30

## 2018-10-02 RX ADMIN — Medication 81 MILLIGRAM(S): at 17:27

## 2018-10-02 RX ADMIN — ATENOLOL 50 MILLIGRAM(S): 25 TABLET ORAL at 06:12

## 2018-10-02 RX ADMIN — OXYCODONE HYDROCHLORIDE 5 MILLIGRAM(S): 5 TABLET ORAL at 20:57

## 2018-10-02 RX ADMIN — PANTOPRAZOLE SODIUM 40 MILLIGRAM(S): 20 TABLET, DELAYED RELEASE ORAL at 06:11

## 2018-10-02 RX ADMIN — Medication 5 MILLIGRAM(S): at 11:33

## 2018-10-02 RX ADMIN — Medication 300 MILLIGRAM(S): at 09:26

## 2018-10-02 RX ADMIN — OXYCODONE HYDROCHLORIDE 2.5 MILLIGRAM(S): 5 TABLET ORAL at 06:41

## 2018-10-02 RX ADMIN — AMLODIPINE BESYLATE 10 MILLIGRAM(S): 2.5 TABLET ORAL at 06:12

## 2018-10-02 RX ADMIN — Medication 81 MILLIGRAM(S): at 06:12

## 2018-10-02 RX ADMIN — Medication 100 MILLIGRAM(S): at 11:33

## 2018-10-02 RX ADMIN — OXYCODONE HYDROCHLORIDE 2.5 MILLIGRAM(S): 5 TABLET ORAL at 06:11

## 2018-10-02 RX ADMIN — Medication 1000 MILLIGRAM(S): at 00:10

## 2018-10-02 RX ADMIN — SODIUM CHLORIDE 1 GRAM(S): 9 INJECTION INTRAMUSCULAR; INTRAVENOUS; SUBCUTANEOUS at 17:27

## 2018-10-02 NOTE — PROVIDER CONTACT NOTE (OTHER) - ASSESSMENT
Bladder scan revealed 335 cc of urine. Pt then voided 300 cc of urine on bedpan. Pt denies feeling uncomfortable.

## 2018-10-02 NOTE — PROGRESS NOTE ADULT - ASSESSMENT
89 yo female with RT hip Fx.  S/P IMN repair  Leukocytosis is likely reactive, it is moderating  History of overactive bladder, urine culture is negative  No clinical signs of infection prior to admission  Monitor off antibiotics, follow wbc count and exam.  Supportive care

## 2018-10-02 NOTE — PROGRESS NOTE ADULT - ASSESSMENT
A/P: 89F w/  mechanical  fall, now  with    R Hip Fx  h/o  inguinal hernia/  recent PPM/ bi[employer]/ 7/18,  HTN  On asa/  atenolol/ norvasc/ lisinopril   ,at home  Pain meds  ehg. paced   elevated wbc  of 20, 00 , now  is  16.000,  coud   be reactive to fall/ stress/ pt is afebrile.     negative  urine  c/s/   hyponatremia/ SERVANDO.  hold lisinopril  labs pending today/ follow hb/ creatinine

## 2018-10-02 NOTE — PROGRESS NOTE ADULT - ASSESSMENT
Impression: Stable       Plan:   Continue present treatment                 Out of bed, ambulate, weight bearing as tolerated                 Physical therapy follow up                 Continue to monitor    Meño Mullins PA-C  Orthopaedic Surgery  Team pager [phone]  [phone]

## 2018-10-03 DIAGNOSIS — K59.00 CONSTIPATION, UNSPECIFIED: ICD-10-CM

## 2018-10-03 DIAGNOSIS — D50.0 IRON DEFICIENCY ANEMIA SECONDARY TO BLOOD LOSS (CHRONIC): ICD-10-CM

## 2018-10-03 DIAGNOSIS — I10 ESSENTIAL (PRIMARY) HYPERTENSION: ICD-10-CM

## 2018-10-03 DIAGNOSIS — S72.009A FRACTURE OF UNSPECIFIED PART OF NECK OF UNSPECIFIED FEMUR, INITIAL ENCOUNTER FOR CLOSED FRACTURE: ICD-10-CM

## 2018-10-03 DIAGNOSIS — Z95.0 PRESENCE OF CARDIAC PACEMAKER: ICD-10-CM

## 2018-10-03 LAB
ANION GAP SERPL CALC-SCNC: 11 MMOL/L — SIGNIFICANT CHANGE UP (ref 5–17)
BUN SERPL-MCNC: 32 MG/DL — HIGH (ref 7–23)
CALCIUM SERPL-MCNC: 8.7 MG/DL — SIGNIFICANT CHANGE UP (ref 8.4–10.5)
CHLORIDE SERPL-SCNC: 91 MMOL/L — LOW (ref 96–108)
CO2 SERPL-SCNC: 26 MMOL/L — SIGNIFICANT CHANGE UP (ref 22–31)
CREAT SERPL-MCNC: 0.9 MG/DL — SIGNIFICANT CHANGE UP (ref 0.5–1.3)
GLUCOSE SERPL-MCNC: 97 MG/DL — SIGNIFICANT CHANGE UP (ref 70–99)
HCT VFR BLD CALC: 31.9 % — LOW (ref 34.5–45)
HGB BLD-MCNC: 10.9 G/DL — LOW (ref 11.5–15.5)
MCHC RBC-ENTMCNC: 29.6 PG — SIGNIFICANT CHANGE UP (ref 27–34)
MCHC RBC-ENTMCNC: 34.2 GM/DL — SIGNIFICANT CHANGE UP (ref 32–36)
MCV RBC AUTO: 86.6 FL — SIGNIFICANT CHANGE UP (ref 80–100)
PLATELET # BLD AUTO: 241 K/UL — SIGNIFICANT CHANGE UP (ref 150–400)
POTASSIUM SERPL-MCNC: 4.4 MMOL/L — SIGNIFICANT CHANGE UP (ref 3.5–5.3)
POTASSIUM SERPL-SCNC: 4.4 MMOL/L — SIGNIFICANT CHANGE UP (ref 3.5–5.3)
RBC # BLD: 3.68 M/UL — LOW (ref 3.8–5.2)
RBC # FLD: 18.6 % — HIGH (ref 10.3–14.5)
SODIUM SERPL-SCNC: 128 MMOL/L — LOW (ref 135–145)
WBC # BLD: 12.5 K/UL — HIGH (ref 3.8–10.5)
WBC # FLD AUTO: 12.5 K/UL — HIGH (ref 3.8–10.5)

## 2018-10-03 RX ORDER — IBUPROFEN 200 MG
400 TABLET ORAL ONCE
Qty: 0 | Refills: 0 | Status: COMPLETED | OUTPATIENT
Start: 2018-10-03 | End: 2018-10-04

## 2018-10-03 RX ADMIN — AMLODIPINE BESYLATE 10 MILLIGRAM(S): 2.5 TABLET ORAL at 05:40

## 2018-10-03 RX ADMIN — Medication 1000 MILLIGRAM(S): at 21:06

## 2018-10-03 RX ADMIN — Medication 20 MILLIEQUIVALENT(S): at 02:46

## 2018-10-03 RX ADMIN — POLYETHYLENE GLYCOL 3350 17 GRAM(S): 17 POWDER, FOR SOLUTION ORAL at 12:26

## 2018-10-03 RX ADMIN — Medication 81 MILLIGRAM(S): at 05:40

## 2018-10-03 RX ADMIN — ATENOLOL 50 MILLIGRAM(S): 25 TABLET ORAL at 05:40

## 2018-10-03 RX ADMIN — Medication 20 MILLIGRAM(S): at 01:44

## 2018-10-03 RX ADMIN — Medication 100 MILLIGRAM(S): at 12:26

## 2018-10-03 RX ADMIN — Medication 5 MILLIGRAM(S): at 12:26

## 2018-10-03 RX ADMIN — Medication 400 MILLIGRAM(S): at 19:50

## 2018-10-03 RX ADMIN — Medication 81 MILLIGRAM(S): at 17:57

## 2018-10-03 RX ADMIN — PANTOPRAZOLE SODIUM 40 MILLIGRAM(S): 20 TABLET, DELAYED RELEASE ORAL at 07:46

## 2018-10-03 NOTE — PROGRESS NOTE ADULT - ASSESSMENT
89 yo F s/p Right IMN POD 3  Pain control  DVT prophylaxis  WBAT  PT/OT  Patient missed PT yesterday 2/2 PRBC, looking forward to get up and moving with PT today  Dispo Planning

## 2018-10-03 NOTE — PROGRESS NOTE ADULT - ASSESSMENT
89 yo female with RT hip Fx.  S/P IMN repair  Leukocytosis is likely reactive, it is moderating  History of overactive bladder, urine culture is negative  No clinical signs of infection prior to admission  She has not manifested signs of active infection  Monitor off antibiotics, follow wbc count and exam.  Supportive care, no additional ID w/u planned,we will stop actively following, please call if ID issues arise.

## 2018-10-03 NOTE — PROVIDER CONTACT NOTE (MEDICATION) - SITUATION
Pt has a lasix order to be given after blood transfusion is complete and a potassium order to be given after lasix is given. Calling for clarification on how long to wait between giving lasix and K.

## 2018-10-03 NOTE — PROVIDER CONTACT NOTE (OTHER) - ASSESSMENT
Family at bedside voicing concerns about pt's "red cheeks". Pt resting in bed, afebrile. Pt denies feeling hot or warm.

## 2018-10-03 NOTE — PROVIDER CONTACT NOTE (OTHER) - ASSESSMENT
Pt asymptomatic, resting in bed, receiving 1/2 unit PRBCs. /69, HR 69, RR 18, O2 sat 95% on room air, temp 97.7.

## 2018-10-04 ENCOUNTER — TRANSCRIPTION ENCOUNTER (OUTPATIENT)
Age: 83
End: 2018-10-04

## 2018-10-04 VITALS
DIASTOLIC BLOOD PRESSURE: 56 MMHG | RESPIRATION RATE: 18 BRPM | SYSTOLIC BLOOD PRESSURE: 128 MMHG | HEART RATE: 82 BPM | TEMPERATURE: 98 F | OXYGEN SATURATION: 94 %

## 2018-10-04 PROBLEM — M10.9 GOUT, UNSPECIFIED: Chronic | Status: ACTIVE | Noted: 2018-09-30

## 2018-10-04 LAB
ANION GAP SERPL CALC-SCNC: 11 MMOL/L — SIGNIFICANT CHANGE UP (ref 5–17)
BUN SERPL-MCNC: 36 MG/DL — HIGH (ref 7–23)
CALCIUM SERPL-MCNC: 8.5 MG/DL — SIGNIFICANT CHANGE UP (ref 8.4–10.5)
CHLORIDE SERPL-SCNC: 95 MMOL/L — LOW (ref 96–108)
CO2 SERPL-SCNC: 26 MMOL/L — SIGNIFICANT CHANGE UP (ref 22–31)
CREAT SERPL-MCNC: 0.87 MG/DL — SIGNIFICANT CHANGE UP (ref 0.5–1.3)
GLUCOSE SERPL-MCNC: 101 MG/DL — HIGH (ref 70–99)
HCT VFR BLD CALC: 29.2 % — LOW (ref 34.5–45)
HGB BLD-MCNC: 9.8 G/DL — LOW (ref 11.5–15.5)
MCHC RBC-ENTMCNC: 29.1 PG — SIGNIFICANT CHANGE UP (ref 27–34)
MCHC RBC-ENTMCNC: 33.6 GM/DL — SIGNIFICANT CHANGE UP (ref 32–36)
MCV RBC AUTO: 86.7 FL — SIGNIFICANT CHANGE UP (ref 80–100)
PLATELET # BLD AUTO: 250 K/UL — SIGNIFICANT CHANGE UP (ref 150–400)
POTASSIUM SERPL-MCNC: 4.1 MMOL/L — SIGNIFICANT CHANGE UP (ref 3.5–5.3)
POTASSIUM SERPL-SCNC: 4.1 MMOL/L — SIGNIFICANT CHANGE UP (ref 3.5–5.3)
RBC # BLD: 3.36 M/UL — LOW (ref 3.8–5.2)
RBC # FLD: 18.5 % — HIGH (ref 10.3–14.5)
SODIUM SERPL-SCNC: 132 MMOL/L — LOW (ref 135–145)
WBC # BLD: 11.2 K/UL — HIGH (ref 3.8–10.5)
WBC # FLD AUTO: 11.2 K/UL — HIGH (ref 3.8–10.5)

## 2018-10-04 PROCEDURE — 87086 URINE CULTURE/COLONY COUNT: CPT

## 2018-10-04 PROCEDURE — 86901 BLOOD TYPING SEROLOGIC RH(D): CPT

## 2018-10-04 PROCEDURE — 73502 X-RAY EXAM HIP UNI 2-3 VIEWS: CPT

## 2018-10-04 PROCEDURE — 85730 THROMBOPLASTIN TIME PARTIAL: CPT

## 2018-10-04 PROCEDURE — 86850 RBC ANTIBODY SCREEN: CPT

## 2018-10-04 PROCEDURE — 86900 BLOOD TYPING SEROLOGIC ABO: CPT

## 2018-10-04 PROCEDURE — 73560 X-RAY EXAM OF KNEE 1 OR 2: CPT

## 2018-10-04 PROCEDURE — 80053 COMPREHEN METABOLIC PANEL: CPT

## 2018-10-04 PROCEDURE — 80048 BASIC METABOLIC PNL TOTAL CA: CPT

## 2018-10-04 PROCEDURE — 97161 PT EVAL LOW COMPLEX 20 MIN: CPT

## 2018-10-04 PROCEDURE — 76000 FLUOROSCOPY <1 HR PHYS/QHP: CPT

## 2018-10-04 PROCEDURE — 93005 ELECTROCARDIOGRAM TRACING: CPT

## 2018-10-04 PROCEDURE — 93306 TTE W/DOPPLER COMPLETE: CPT

## 2018-10-04 PROCEDURE — 85027 COMPLETE CBC AUTOMATED: CPT

## 2018-10-04 PROCEDURE — 73552 X-RAY EXAM OF FEMUR 2/>: CPT

## 2018-10-04 PROCEDURE — 85610 PROTHROMBIN TIME: CPT

## 2018-10-04 PROCEDURE — 97116 GAIT TRAINING THERAPY: CPT

## 2018-10-04 PROCEDURE — 86923 COMPATIBILITY TEST ELECTRIC: CPT

## 2018-10-04 PROCEDURE — 36430 TRANSFUSION BLD/BLD COMPNT: CPT

## 2018-10-04 PROCEDURE — 70450 CT HEAD/BRAIN W/O DYE: CPT

## 2018-10-04 PROCEDURE — C1713: CPT

## 2018-10-04 PROCEDURE — C1889: CPT

## 2018-10-04 PROCEDURE — 71045 X-RAY EXAM CHEST 1 VIEW: CPT

## 2018-10-04 PROCEDURE — 99285 EMERGENCY DEPT VISIT HI MDM: CPT | Mod: 25

## 2018-10-04 PROCEDURE — 97110 THERAPEUTIC EXERCISES: CPT

## 2018-10-04 PROCEDURE — 96374 THER/PROPH/DIAG INJ IV PUSH: CPT

## 2018-10-04 PROCEDURE — P9011: CPT

## 2018-10-04 PROCEDURE — 81001 URINALYSIS AUTO W/SCOPE: CPT

## 2018-10-04 RX ORDER — OXYBUTYNIN CHLORIDE 5 MG
1 TABLET ORAL
Qty: 0 | Refills: 0 | COMMUNITY
Start: 2018-10-04

## 2018-10-04 RX ORDER — ATENOLOL 25 MG/1
1 TABLET ORAL
Qty: 0 | Refills: 0 | COMMUNITY

## 2018-10-04 RX ORDER — ALLOPURINOL 300 MG
1 TABLET ORAL
Qty: 0 | Refills: 0 | COMMUNITY

## 2018-10-04 RX ORDER — AMLODIPINE BESYLATE 2.5 MG/1
1 TABLET ORAL
Qty: 0 | Refills: 0 | COMMUNITY
Start: 2018-10-04

## 2018-10-04 RX ORDER — ALLOPURINOL 300 MG
1 TABLET ORAL
Qty: 0 | Refills: 0 | COMMUNITY
Start: 2018-10-04

## 2018-10-04 RX ORDER — OXYBUTYNIN CHLORIDE 5 MG
1 TABLET ORAL
Qty: 0 | Refills: 0 | COMMUNITY

## 2018-10-04 RX ORDER — ATENOLOL 25 MG/1
1 TABLET ORAL
Qty: 0 | Refills: 0 | COMMUNITY
Start: 2018-10-04

## 2018-10-04 RX ORDER — ACETAMINOPHEN 500 MG
1000 TABLET ORAL ONCE
Qty: 0 | Refills: 0 | Status: COMPLETED | OUTPATIENT
Start: 2018-10-04 | End: 2018-10-04

## 2018-10-04 RX ORDER — PANTOPRAZOLE SODIUM 20 MG/1
1 TABLET, DELAYED RELEASE ORAL
Qty: 0 | Refills: 0 | COMMUNITY

## 2018-10-04 RX ORDER — POLYETHYLENE GLYCOL 3350 17 G/17G
17 POWDER, FOR SOLUTION ORAL
Qty: 0 | Refills: 0 | COMMUNITY

## 2018-10-04 RX ORDER — ASPIRIN/CALCIUM CARB/MAGNESIUM 324 MG
1 TABLET ORAL
Qty: 0 | Refills: 0 | COMMUNITY
Start: 2018-10-04

## 2018-10-04 RX ORDER — PANTOPRAZOLE SODIUM 20 MG/1
1 TABLET, DELAYED RELEASE ORAL
Qty: 0 | Refills: 0 | COMMUNITY
Start: 2018-10-04

## 2018-10-04 RX ORDER — AMLODIPINE BESYLATE 2.5 MG/1
1 TABLET ORAL
Qty: 0 | Refills: 0 | COMMUNITY

## 2018-10-04 RX ORDER — OXYCODONE HYDROCHLORIDE 5 MG/1
1 TABLET ORAL
Qty: 0 | Refills: 0 | COMMUNITY
Start: 2018-10-04

## 2018-10-04 RX ORDER — ACETAMINOPHEN 500 MG
2 TABLET ORAL
Qty: 0 | Refills: 0 | COMMUNITY
Start: 2018-10-04

## 2018-10-04 RX ORDER — DOCUSATE SODIUM 100 MG
1 CAPSULE ORAL
Qty: 0 | Refills: 0 | COMMUNITY
Start: 2018-10-04

## 2018-10-04 RX ORDER — ASPIRIN/CALCIUM CARB/MAGNESIUM 324 MG
1 TABLET ORAL
Qty: 0 | Refills: 0 | COMMUNITY

## 2018-10-04 RX ADMIN — Medication 5 MILLIGRAM(S): at 11:56

## 2018-10-04 RX ADMIN — AMLODIPINE BESYLATE 10 MILLIGRAM(S): 2.5 TABLET ORAL at 05:28

## 2018-10-04 RX ADMIN — ATENOLOL 50 MILLIGRAM(S): 25 TABLET ORAL at 05:28

## 2018-10-04 RX ADMIN — PANTOPRAZOLE SODIUM 40 MILLIGRAM(S): 20 TABLET, DELAYED RELEASE ORAL at 05:28

## 2018-10-04 RX ADMIN — Medication 1000 MILLIGRAM(S): at 07:00

## 2018-10-04 RX ADMIN — Medication 400 MILLIGRAM(S): at 06:32

## 2018-10-04 RX ADMIN — Medication 81 MILLIGRAM(S): at 05:28

## 2018-10-04 RX ADMIN — Medication 100 MILLIGRAM(S): at 11:56

## 2018-10-04 RX ADMIN — Medication 400 MILLIGRAM(S): at 04:36

## 2018-10-04 NOTE — PROGRESS NOTE ADULT - ATTENDING COMMENTS
signing off, please have her follow up with me in the office in  2 weeks for primary care and post discharge follow up.    Thanks,.    Jesus Martinez,   Internal Medicine

## 2018-10-04 NOTE — DISCHARGE NOTE ADULT - PATIENT PORTAL LINK FT
You can access the [facility] Portal, offered by [facility], by registering with the following website: [email or web address]

## 2018-10-04 NOTE — PROGRESS NOTE ADULT - PROBLEM SELECTOR PLAN 3
Dr Aguila following  had ppm placed 2 months ago at [facility] for sick sinus syndrome

## 2018-10-04 NOTE — PROGRESS NOTE ADULT - PROBLEM SELECTOR PLAN 2
continue current regimen, goal SBP around 150.  reasonable in elderly.  discharge on current regimen.

## 2018-10-04 NOTE — PROGRESS NOTE ADULT - SUBJECTIVE AND OBJECTIVE BOX
no comaplints  REVIEW OF SYSTEMS:  GEN: no fever,    no chills  RESP: no SOB,   no cough  CVS: no chest pain,   no palpitations  GI: no abdominal pain,   no nausea,   no vomiting,   no constipation,   no diarrhea  : no dysuria,   no frequency  NEURO: no headache,   no dizziness  PSYCH: no depression,   not anxious  Derm : no rash    MEDICATIONS  (STANDING):  acetaminophen  IVPB .. 750 milliGRAM(s) IV Intermittent once  allopurinol 100 milliGRAM(s) Oral daily  amLODIPine   Tablet 10 milliGRAM(s) Oral daily  aspirin enteric coated 81 milliGRAM(s) Oral two times a day  ATENolol  Tablet 50 milliGRAM(s) Oral daily  influenza   Vaccine 0.5 milliLiter(s) IntraMuscular once  lactated ringers. 1000 milliLiter(s) (30 mL/Hr) IV Continuous <Continuous>  oxybutynin 5 milliGRAM(s) Oral daily  pantoprazole    Tablet 40 milliGRAM(s) Oral before breakfast    MEDICATIONS  (PRN):  acetaminophen   Tablet .. 650 milliGRAM(s) Oral every 6 hours PRN Temp greater or equal to 38C (100.4F), Mild Pain (1 - 3)  docusate sodium 100 milliGRAM(s) Oral three times a day PRN Constipation  magnesium hydroxide Suspension 30 milliLiter(s) Oral daily PRN Constipation  melatonin 3 milliGRAM(s) Oral at bedtime PRN Insomnia  morphine  - Injectable 1 milliGRAM(s) IV Push every 2 hours PRN Severe Pain (7 - 10)  oxyCODONE    IR 2.5 milliGRAM(s) Oral every 4 hours PRN Moderate Pain (4 - 6)  oxyCODONE    IR 5 milliGRAM(s) Oral every 4 hours PRN Severe Pain (7 - 10)  polyethylene glycol 3350 17 Gram(s) Oral daily PRN Constipation  senna 2 Tablet(s) Oral at bedtime PRN Constipation      Vital Signs Last 24 Hrs  T(C): 36.7 (02 Oct 2018 06:05), Max: 36.8 (01 Oct 2018 08:00)  T(F): 98.1 (02 Oct 2018 06:05), Max: 98.2 (01 Oct 2018 08:00)  HR: 64 (02 Oct 2018 06:05) (60 - 68)  BP: 120/60 (02 Oct 2018 06:05) (102/62 - 120/60)  BP(mean): --  RR: 18 (02 Oct 2018 06:05) (16 - 18)  SpO2: 94% (02 Oct 2018 06:05) (94% - 96%)  CAPILLARY BLOOD GLUCOSE        I&O's Summary    01 Oct 2018 07:01  -  02 Oct 2018 07:00  --------------------------------------------------------  IN: 1120 mL / OUT: 300 mL / NET: 820 mL        PHYSICAL EXAM:  HEAD:  Atraumatic, Normocephalic  NECK: Supple, No   JVD  CHEST/LUNG:   no     rales,     no,    rhonchi  HEART: Regular rate and rhythm;         murmur  ABDOMEN: Soft, Nontender, ;   EXTREMITIES:        edema  NEUROLOGY:  alert    LABS:                        8.8    16.30 )-----------( 201      ( 01 Oct 2018 07:51 )             26.0     10-    129<L>  |  94<L>  |  41<H>  ----------------------------<  167<H>  4.6   |  21<L>  |  1.34<H>    Ca    8.5      01 Oct 2018 06:42            Urinalysis Basic - ( 30 Sep 2018 07:50 )    Color: Yellow / Appearance: Slightly Turbid / S.029 / pH: x  Gluc: x / Ketone: Negative  / Bili: Negative / Urobili: 3 mg/dL   Blood: x / Protein: 30 mg/dL / Nitrite: Negative   Leuk Esterase: Large / RBC: 15 /hpf /  /hpf   Sq Epi: x / Non Sq Epi: 11 /hpf / Bacteria: Moderate                      Consultant(s) Notes Reviewed:      Care Discussed with Consultants/Other Providers:
- Patient seen and examined.  - In summary, patient is a 89 year old woman who presented with a R hip Fx (01 Oct 2018 07:26)  - Today, patient is without complaints.         *****MEDICATIONS:    MEDICATIONS  (STANDING):  allopurinol 100 milliGRAM(s) Oral daily  amLODIPine   Tablet 10 milliGRAM(s) Oral daily  aspirin enteric coated 81 milliGRAM(s) Oral two times a day  ATENolol  Tablet 50 milliGRAM(s) Oral daily  docusate sodium 100 milliGRAM(s) Oral three times a day  influenza   Vaccine 0.5 milliLiter(s) IntraMuscular once  lactated ringers. 1000 milliLiter(s) (30 mL/Hr) IV Continuous <Continuous>  oxybutynin 5 milliGRAM(s) Oral daily  polyethylene glycol 3350 17 Gram(s) Oral daily  senna 2 Tablet(s) Oral at bedtime    MEDICATIONS  (PRN):  magnesium hydroxide Suspension 30 milliLiter(s) Oral daily PRN Constipation  melatonin 3 milliGRAM(s) Oral at bedtime PRN Insomnia  morphine  - Injectable 1 milliGRAM(s) IV Push every 2 hours PRN Severe Pain (7 - 10)  oxyCODONE    IR 2.5 milliGRAM(s) Oral every 4 hours PRN Moderate Pain (4 - 6)  oxyCODONE    IR 5 milliGRAM(s) Oral every 4 hours PRN Severe Pain (7 - 10)           ***** REVIEW OF SYSTEM:  GEN: no fever, no chills, no pain  RESP: no SOB, no cough, no sputum  CVS: no chest pain, no palpitations, no edema  GI: no abdominal pain, no nausea, no vomiting, no constipation, no diarrhea  : no dysuria, no frequency  NEURO: no headache, no dizziness  PSYCH: no depression, not anxious  Derm : no itching, no rash         ***** VITAL SIGNS:  T(F): 98.2 (10-01-18 @ 08:35), Max: 98.2 (10-01-18 @ 08:00)  HR: 66 (10-01-18 @ 09:55) (60 - 73)  BP: 104/51 (10-01-18 @ 09:55) (96/52 - 159/68)  RR: 16 (10-01-18 @ 08:35) (14 - 16)  SpO2: 96% (10-01-18 @ 09:55) (93% - 100%)  Wt(kg): --  ,   I&O's Summary    30 Sep 2018 07:01  -  01 Oct 2018 07:00  --------------------------------------------------------  IN: 385 mL / OUT: 350 mL / NET: 35 mL             *****PHYSICAL EXAM:  GEN: A&O X 3 , NAD , comfortable  HEENT: NCAT, EOMI, MMM, no icterus  NECK: Supple, No JVD  CVS: S1S2 , regular , No M/R/G appreciated  PULM: CTA B/L,  no W/R/R appreciated  ABD.: soft. non tender, non distended,  bowel sounds present  Extrem: intact pulses , no edema noted  Derm: No rash or ecchymosis noted  PSYCH: normal mood, no depression, not anxious         *****LAB AND IMAGIN.8    16.30 )-----------( 201      ( 01 Oct 2018 07:51 )             26.0               10-    129<L>  |  94<L>  |  41<H>  ----------------------------<  167<H>  4.6   |  21<L>  |  1.34<H>    Ca    8.5      01 Oct 2018 06:42    TPro  5.9<L>  /  Alb  3.0<L>  /  TBili  0.3  /  DBili  x   /  AST  29  /  ALT  24  /  AlkPhos  88      PT/INR - ( 30 Sep 2018 04:21 )   PT: 10.8 sec;   INR: 1.00 ratio         PTT - ( 30 Sep 2018 04:21 )  PTT:29.2 sec                   Urinalysis Basic - ( 30 Sep 2018 07:50 )    Color: Yellow / Appearance: Slightly Turbid / S.029 / pH: x  Gluc: x / Ketone: Negative  / Bili: Negative / Urobili: 3 mg/dL   Blood: x / Protein: 30 mg/dL / Nitrite: Negative   Leuk Esterase: Large / RBC: 15 /hpf /  /hpf   Sq Epi: x / Non Sq Epi: 11 /hpf / Bacteria: Moderate      [All pertinent recent Imaging/Reports reviewed]  < from: Transthoracic Echocardiogram (18 @ 09:00) >  Conclusions:  1. Mitral annular calcification and calcified mitral  leaflets with normal diastolic opening. Minimal mitral  regurgitation.  2. Aortic valve not well visualized;appears calcified.  Peak transaortic valve gradient equals 7 mm Hg, mean  transaortic valve gradient equals 3 mm Hg, estimated aortic  valve area equals 1.8 sqcm (by continuity equation), aortic  valve velocity time integral equals 32 cm, consistent with  mild aortic stenosis. Minimal aortic regurgitation.  3. Normal left ventricular systolic function. Septal motion  consistent with paced rhythm.  4. Normal right ventricular size and function. A device  wire is noted in the right heart.    < end of copied text >         *****A S S E S S M E N T   A N D   P L A N :  89F s/p ppm adm s/p fall with hip Fx  s/p IMN  ortho f/u  pain control  echo noted  VSS  PT  dvt prophylaxis      __________________________  DEL Aguila D.O.
- Patient seen and examined.  - In summary, patient is a 89 year old woman who presented with a R hip Fx (01 Oct 2018 07:26)  - Today, patient is without complaints.         *****MEDICATIONS:    MEDICATIONS  (STANDING):  allopurinol 100 milliGRAM(s) Oral daily  amLODIPine   Tablet 10 milliGRAM(s) Oral daily  aspirin enteric coated 81 milliGRAM(s) Oral two times a day  ATENolol  Tablet 50 milliGRAM(s) Oral daily  influenza   Vaccine 0.5 milliLiter(s) IntraMuscular once  lactated ringers. 1000 milliLiter(s) (30 mL/Hr) IV Continuous <Continuous>  oxybutynin 5 milliGRAM(s) Oral daily  pantoprazole    Tablet 40 milliGRAM(s) Oral before breakfast    MEDICATIONS  (PRN):  acetaminophen   Tablet .. 650 milliGRAM(s) Oral every 6 hours PRN Temp greater or equal to 38C (100.4F), Mild Pain (1 - 3)  docusate sodium 100 milliGRAM(s) Oral three times a day PRN Constipation  magnesium hydroxide Suspension 30 milliLiter(s) Oral daily PRN Constipation  melatonin 3 milliGRAM(s) Oral at bedtime PRN Insomnia  morphine  - Injectable 1 milliGRAM(s) IV Push every 2 hours PRN Severe Pain (7 - 10)  oxyCODONE    IR 2.5 milliGRAM(s) Oral every 4 hours PRN Moderate Pain (4 - 6)  oxyCODONE    IR 5 milliGRAM(s) Oral every 4 hours PRN Severe Pain (7 - 10)  polyethylene glycol 3350 17 Gram(s) Oral daily PRN Constipation  senna 2 Tablet(s) Oral at bedtime PRN Constipation             ***** REVIEW OF SYSTEM:  GEN: no fever, no chills, no pain  RESP: no SOB, no cough, no sputum  CVS: no chest pain, no palpitations, no edema  GI: no abdominal pain, no nausea, no vomiting, no constipation, no diarrhea  : no dysuria, no frequency  NEURO: no headache, no dizziness  PSYCH: no depression, not anxious  Derm : no itching, no rash         ***** VITAL SIGNS:    T(F): 97.9 (10-02-18 @ 08:16), Max: 98.2 (10-01-18 @ 14:33)  HR: 61 (10-02-18 @ 08:16) (61 - 68)  BP: 126/60 (10-02-18 @ 08:16) (102/62 - 126/60)  RR: 18 (10-02-18 @ 08:16) (18 - 18)  SpO2: 99% (10-02-18 @ 08:16) (94% - 99%)  Wt(kg): --  ,   I&O's Summary    01 Oct 2018 07:  -  02 Oct 2018 07:00  --------------------------------------------------------  IN: 1240 mL / OUT: 300 mL / NET: 940 mL    02 Oct 2018 07:01  -  02 Oct 2018 10:54  --------------------------------------------------------  IN: 280 mL / OUT: 150 mL / NET: 130 mL                 *****PHYSICAL EXAM:  GEN: A&O X 3 , NAD , comfortable  HEENT: NCAT, EOMI, MMM, no icterus  NECK: Supple, No JVD  CVS: S1S2 , regular , No M/R/G appreciated  PULM: CTA B/L,  no W/R/R appreciated  ABD.: soft. non tender, non distended,  bowel sounds present  Extrem: intact pulses , no edema noted  Derm: No rash or ecchymosis noted  PSYCH: normal mood, no depression, not anxious         *****LAB AND IMAGIN.7    12.61 )-----------( 206      ( 02 Oct 2018 07:55 )             19.1               10-    127<L>  |  93<L>  |  40<H>  ----------------------------<  86  4.5   |  25  |  1.13    Ca    8.5      02 Oct 2018 07:05        [All pertinent recent Imaging/Reports reviewed]  < from: Transthoracic Echocardiogram (09.30.18 @ 09:00) >  Conclusions:  1. Mitral annular calcification and calcified mitral  leaflets with normal diastolic opening. Minimal mitral  regurgitation.  2. Aortic valve not well visualized;appears calcified.  Peak transaortic valve gradient equals 7 mm Hg, mean  transaortic valve gradient equals 3 mm Hg, estimated aortic  valve area equals 1.8 sqcm (by continuity equation), aortic  valve velocity time integral equals 32 cm, consistent with  mild aortic stenosis. Minimal aortic regurgitation.  3. Normal left ventricular systolic function. Septal motion  consistent with paced rhythm.  4. Normal right ventricular size and function. A device  wire is noted in the right heart.    < end of copied text >         *****A S S E S S M E N T   A N D   P L A N :  89F s/p ppm adm s/p fall with hip Fx  s/p IMN  ortho f/u  pain control  echo noted  VSS  PT  dvt prophylaxis  DCP    __________________________  DEL Aguila D.O.
- Patient seen and examined.  - In summary, patient is a 89 year old woman who presented with a R hip Fx (01 Oct 2018 07:26)  - Today, patient is without complaints.         *****MEDICATIONS:    MEDICATIONS  (STANDING):  allopurinol 100 milliGRAM(s) Oral daily  amLODIPine   Tablet 10 milliGRAM(s) Oral daily  aspirin enteric coated 81 milliGRAM(s) Oral two times a day  ATENolol  Tablet 50 milliGRAM(s) Oral daily  influenza   Vaccine 0.5 milliLiter(s) IntraMuscular once  oxybutynin 5 milliGRAM(s) Oral daily  pantoprazole    Tablet 40 milliGRAM(s) Oral before breakfast  polyethylene glycol 3350 17 Gram(s) Oral daily    MEDICATIONS  (PRN):  acetaminophen   Tablet .. 650 milliGRAM(s) Oral every 6 hours PRN Temp greater or equal to 38C (100.4F), Mild Pain (1 - 3)  acetaminophen  IVPB .. 1000 milliGRAM(s) IV Intermittent once PRN Severe Pain (7 - 10)  docusate sodium 100 milliGRAM(s) Oral three times a day PRN Constipation  magnesium hydroxide Suspension 30 milliLiter(s) Oral daily PRN Constipation  melatonin 3 milliGRAM(s) Oral at bedtime PRN Insomnia  morphine  - Injectable 1 milliGRAM(s) IV Push every 2 hours PRN Severe Pain (7 - 10)  oxyCODONE    IR 2.5 milliGRAM(s) Oral every 4 hours PRN Moderate Pain (4 - 6)  oxyCODONE    IR 5 milliGRAM(s) Oral every 4 hours PRN Severe Pain (7 - 10)  senna 2 Tablet(s) Oral at bedtime PRN Constipation             ***** REVIEW OF SYSTEM:  GEN: no fever, no chills, no pain  RESP: no SOB, no cough, no sputum  CVS: no chest pain, no palpitations, no edema  GI: no abdominal pain, no nausea, no vomiting, no constipation, no diarrhea  : no dysuria, no frequency  NEURO: no headache, no dizziness  PSYCH: no depression, not anxious  Derm : no itching, no rash         ***** VITAL SIGNS:    T(F): 97.9 (10-03-18 @ 08:19), Max: 98.7 (10-03-18 @ 00:15)  HR: 75 (10-03-18 @ 08:19) (60 - 75)  BP: 150/77 (10-03-18 @ 08:19) (111/54 - 157/68)  RR: 18 (10-03-18 @ 08:19) (17 - 19)  SpO2: 96% (10-03-18 @ 08:19) (93% - 99%)  Wt(kg): --  ,   I&O's Summary    02 Oct 2018 07:01  -  03 Oct 2018 07:00  --------------------------------------------------------  IN: 1240 mL / OUT: 1700 mL / NET: -460 mL    03 Oct 2018 07:01  -  03 Oct 2018 11:04  --------------------------------------------------------  IN: 200 mL / OUT: 200 mL / NET: 0 mL                   *****PHYSICAL EXAM:  GEN: A&O X 3 , NAD , comfortable  HEENT: NCAT, EOMI, MMM, no icterus  NECK: Supple, No JVD  CVS: S1S2 , regular , No M/R/G appreciated  PULM: CTA B/L,  no W/R/R appreciated  ABD.: soft. non tender, non distended,  bowel sounds present  Extrem: intact pulses , no edema noted  Derm: No rash or ecchymosis noted  PSYCH: normal mood, no depression, not anxious         *****LAB AND IMAGING:                                   10.9   12.5  )-----------( 241      ( 03 Oct 2018 10:14 )             31.9               10-03    128<L>  |  91<L>  |  32<H>  ----------------------------<  97  4.4   |  26  |  0.90    Ca    8.7      03 Oct 2018 10:00        [All pertinent recent Imaging/Reports reviewed]  < from: Transthoracic Echocardiogram (09.30.18 @ 09:00) >  Conclusions:  1. Mitral annular calcification and calcified mitral  leaflets with normal diastolic opening. Minimal mitral  regurgitation.  2. Aortic valve not well visualized;appears calcified.  Peak transaortic valve gradient equals 7 mm Hg, mean  transaortic valve gradient equals 3 mm Hg, estimated aortic  valve area equals 1.8 sqcm (by continuity equation), aortic  valve velocity time integral equals 32 cm, consistent with  mild aortic stenosis. Minimal aortic regurgitation.  3. Normal left ventricular systolic function. Septal motion  consistent with paced rhythm.  4. Normal right ventricular size and function. A device  wire is noted in the right heart.    < end of copied text >         *****A S S E S S M E N T   A N D   P L A N :  89F s/p ppm adm s/p fall with hip Fx  s/p IMN  ortho f/u  pain control  echo noted  VSS  PT  dvt prophylaxis  DCP    __________________________  DEL Aguila D.O.
- Patient seen and examined.  - In summary, patient is a 89 year old woman who presented with a R hip Fx (01 Oct 2018 07:26)  - Today, patient is without complaints.         *****MEDICATIONS:    MEDICATIONS  (STANDING):  allopurinol 100 milliGRAM(s) Oral daily  amLODIPine   Tablet 10 milliGRAM(s) Oral daily  aspirin enteric coated 81 milliGRAM(s) Oral two times a day  ATENolol  Tablet 50 milliGRAM(s) Oral daily  influenza   Vaccine 0.5 milliLiter(s) IntraMuscular once  oxybutynin 5 milliGRAM(s) Oral daily  pantoprazole    Tablet 40 milliGRAM(s) Oral before breakfast  polyethylene glycol 3350 17 Gram(s) Oral daily    MEDICATIONS  (PRN):  acetaminophen   Tablet .. 650 milliGRAM(s) Oral every 6 hours PRN Temp greater or equal to 38C (100.4F), Mild Pain (1 - 3)  docusate sodium 100 milliGRAM(s) Oral three times a day PRN Constipation  magnesium hydroxide Suspension 30 milliLiter(s) Oral daily PRN Constipation  melatonin 3 milliGRAM(s) Oral at bedtime PRN Insomnia  morphine  - Injectable 1 milliGRAM(s) IV Push every 2 hours PRN Severe Pain (7 - 10)  oxyCODONE    IR 2.5 milliGRAM(s) Oral every 4 hours PRN Moderate Pain (4 - 6)  oxyCODONE    IR 5 milliGRAM(s) Oral every 4 hours PRN Severe Pain (7 - 10)  senna 2 Tablet(s) Oral at bedtime PRN Constipation             ***** REVIEW OF SYSTEM:  GEN: no fever, no chills, no pain  RESP: no SOB, no cough, no sputum  CVS: no chest pain, no palpitations, no edema  GI: no abdominal pain, no nausea, no vomiting, no constipation, no diarrhea  : no dysuria, no frequency  NEURO: no headache, no dizziness  PSYCH: no depression, not anxious  Derm : no itching, no rash         ***** VITAL SIGNS:    T(F): 97.9 (10-04-18 @ 08:52), Max: 98 (10-03-18 @ 12:13)  HR: 82 (10-04-18 @ 08:52) (60 - 82)  BP: 128/56 (10-04-18 @ 08:52) (124/69 - 148/72)  RR: 18 (10-04-18 @ 08:52) (18 - 18)  SpO2: 94% (10-04-18 @ 08:52) (93% - 97%)  Wt(kg): --  ,   I&O's Summary    03 Oct 2018 07:  -  04 Oct 2018 07:00  --------------------------------------------------------  IN: 650 mL / OUT: 400 mL / NET: 250 mL    04 Oct 2018 07:  -  04 Oct 2018 11:19  --------------------------------------------------------  IN: 240 mL / OUT: 0 mL / NET: 240 mL             *****PHYSICAL EXAM:  GEN: A&O X 3 , NAD , comfortable  HEENT: NCAT, EOMI, MMM, no icterus  NECK: Supple, No JVD  CVS: S1S2 , regular , No M/R/G appreciated  PULM: CTA B/L,  no W/R/R appreciated  ABD.: soft. non tender, non distended,  bowel sounds present  Extrem: intact pulses , no edema noted  Derm: No rash or ecchymosis noted  PSYCH: normal mood, no depression, not anxious         *****LAB AND IMAGIN.8    11.2  )-----------( 250      ( 04 Oct 2018 07:13 )             29.2               10-04    132<L>  |  95<L>  |  36<H>  ----------------------------<  101<H>  4.1   |  26  |  0.87    Ca    8.5      04 Oct 2018 07:23        [All pertinent recent Imaging/Reports reviewed]  < from: Transthoracic Echocardiogram (18 @ 09:00) >  Conclusions:  1. Mitral annular calcification and calcified mitral  leaflets with normal diastolic opening. Minimal mitral  regurgitation.  2. Aortic valve not well visualized;appears calcified.  Peak transaortic valve gradient equals 7 mm Hg, mean  transaortic valve gradient equals 3 mm Hg, estimated aortic  valve area equals 1.8 sqcm (by continuity equation), aortic  valve velocity time integral equals 32 cm, consistent with  mild aortic stenosis. Minimal aortic regurgitation.  3. Normal left ventricular systolic function. Septal motion  consistent with paced rhythm.  4. Normal right ventricular size and function. A device  wire is noted in the right heart.    < end of copied text >         *****A S S E S S M E N T   A N D   P L A N :  89F s/p ppm adm s/p fall with hip Fx  s/p IMN  ortho f/u noted  pain control  echo noted  VSS  PT  dvt prophylaxis  DCP    __________________________  A. JAKE Aguila.
89F HTN, sick sinus syndrome s/p  PPM  admitted s/p mechanical fall s/p IMN to right hip.  denies SOB or CP. had small BM yesterday. no abd pain. denies bloody stools.  slight pain  at sight of right hip.     MEDICATIONS  (STANDING):  allopurinol 100 milliGRAM(s) Oral daily  amLODIPine   Tablet 10 milliGRAM(s) Oral daily  aspirin enteric coated 81 milliGRAM(s) Oral two times a day  ATENolol  Tablet 50 milliGRAM(s) Oral daily  influenza   Vaccine 0.5 milliLiter(s) IntraMuscular once  oxybutynin 5 milliGRAM(s) Oral daily  pantoprazole    Tablet 40 milliGRAM(s) Oral before breakfast  polyethylene glycol 3350 17 Gram(s) Oral daily    MEDICATIONS  (PRN):  acetaminophen   Tablet .. 650 milliGRAM(s) Oral every 6 hours PRN Temp greater or equal to 38C (100.4F), Mild Pain (1 - 3)  acetaminophen  IVPB .. 1000 milliGRAM(s) IV Intermittent once PRN Severe Pain (7 - 10)  docusate sodium 100 milliGRAM(s) Oral three times a day PRN Constipation  magnesium hydroxide Suspension 30 milliLiter(s) Oral daily PRN Constipation  melatonin 3 milliGRAM(s) Oral at bedtime PRN Insomnia  morphine  - Injectable 1 milliGRAM(s) IV Push every 2 hours PRN Severe Pain (7 - 10)  oxyCODONE    IR 2.5 milliGRAM(s) Oral every 4 hours PRN Moderate Pain (4 - 6)  oxyCODONE    IR 5 milliGRAM(s) Oral every 4 hours PRN Severe Pain (7 - 10)  senna 2 Tablet(s) Oral at bedtime PRN Constipation      Allergies    No Known Allergies    Intolerances        Vital Signs Last 24 Hrs  T(C): 36.6 (03 Oct 2018 08:19), Max: 37.1 (03 Oct 2018 00:15)  T(F): 97.9 (03 Oct 2018 08:19), Max: 98.7 (03 Oct 2018 00:15)  HR: 75 (03 Oct 2018 08:19) (60 - 75)  BP: 150/77 (03 Oct 2018 08:19) (111/54 - 157/68)  BP(mean): --  RR: 18 (03 Oct 2018 08:19) (17 - 19)  SpO2: 96% (03 Oct 2018 08:19) (93% - 99%)    PHYSICAL EXAM:    aaox3, nad  rrr s1 s2 no murmurs  clear lungs  abd soft ntnd  +ttp at  right lateral hip at sight of bandage  no calf swelling  no focal deficits  no rashes      LABS:                        6.7    12.61 )-----------( 206      ( 02 Oct 2018 07:55 )             19.1     10-02    127<L>  |  93<L>  |  40<H>  ----------------------------<  86  4.5   |  25  |  1.13    Ca    8.5      02 Oct 2018 07:05            RADIOLOGY & ADDITIONAL STUDIES:
CC: f/u for leukocytosis    Patient reports: frustrated by limited PT    REVIEW OF SYSTEMS:  All other review of systems negative (Comprehensive ROS)    Antimicrobials Day #  :off    Other Medications Reviewed    T(F): 98.2 (10-01-18 @ 08:35), Max: 98.2 (10-01-18 @ 08:00)  HR: 66 (10-01-18 @ 09:55)  BP: 104/51 (10-01-18 @ 09:55)  RR: 16 (10-01-18 @ 08:35)  SpO2: 96% (10-01-18 @ 09:55)  Wt(kg): --    PHYSICAL EXAM:  General: alert, no acute distress  Eyes:  anicteric, no conjunctival injection, no discharge  Oropharynx: no lesions or injection 	  Neck: supple, without adenopathy  Lungs: clear to auscultation  Heart: regular rate and rhythm; no murmur, rubs or gallops  Abdomen: soft, nondistended, nontender, without mass or organomegaly  Skin: scattered bruises  Extremities: no clubbing, cyanosis, or edema, RT hip incisions are dressed  Neurologic: alert, oriented, moves all extremities    LAB RESULTS:                        8.8    16.30 )-----------( 201      ( 01 Oct 2018 07:51 )             26.0     10-    129<L>  |  94<L>  |  41<H>  ----------------------------<  167<H>  4.6   |  21<L>  |  1.34<H>    Ca    8.5      01 Oct 2018 06:42    TPro  5.9<L>  /  Alb  3.0<L>  /  TBili  0.3  /  DBili  x   /  AST  29  /  ALT  24  /  AlkPhos  88  09-29    LIVER FUNCTIONS - ( 29 Sep 2018 23:49 )  Alb: 3.0 g/dL / Pro: 5.9 g/dL / ALK PHOS: 88 U/L / ALT: 24 U/L / AST: 29 U/L / GGT: x           Urinalysis Basic - ( 30 Sep 2018 07:50 )    Color: Yellow / Appearance: Slightly Turbid / S.029 / pH: x  Gluc: x / Ketone: Negative  / Bili: Negative / Urobili: 3 mg/dL   Blood: x / Protein: 30 mg/dL / Nitrite: Negative   Leuk Esterase: Large / RBC: 15 /hpf /  /hpf   Sq Epi: x / Non Sq Epi: 11 /hpf / Bacteria: Moderate      MICROBIOLOGY:  RECENT CULTURES:      RADIOLOGY REVIEWED:    < from: Xray Knee 1 or 2 Views, Right (18 @ 00:48) >  INTERPRETATION:  CLINICAL INFORMATION: Fall with right leg pain.    TECHNIQUE: Frontal radiograph the pelvis, 2 views the right hip, 2 views   of the right femur, 2 views of the right knee.    COMPARISON: CT abdomen pelvis from 2018.    IMPRESSION:     Acute intertrochanteric right hip fracture with superior displacement and   external rotation of the distal fracture fragment. No displaced pelvic   fracture. Arthrosis of the bilateral hips, pubic symphysis, and right   knee is noted. The knee joint is intact. Atherosclerotic calcifications   are noted    < end of copied text >
CC: f/u for leukocytosis    Patient reports: no complaints, resting comfortably    REVIEW OF SYSTEMS:  All other review of systems negative (Comprehensive ROS)    Antimicrobials Day #  :off    Other Medications Reviewed    T(F): 98 (10-03-18 @ 12:13), Max: 98.7 (10-03-18 @ 00:15)  HR: 73 (10-03-18 @ 12:13)  BP: 148/72 (10-03-18 @ 12:13)  RR: 18 (10-03-18 @ 12:13)  SpO2: 96% (10-03-18 @ 12:13)  Wt(kg): --    PHYSICAL EXAM:  General: alert, no acute distress  Eyes:  anicteric, no conjunctival injection, no discharge  Oropharynx: no lesions or injection 	  Neck: supple, without adenopathy  Lungs: clear to auscultation  Heart: regular rate and rhythm; no murmur, rubs or gallops  Abdomen: soft, nondistended, nontender, without mass or organomegaly  Skin: no lesions  Extremities: no clubbing, cyanosis, + edema of right leg.Rt hip incisions dressed, dry  Neurologic: alert, oriented, moves all extremities    LAB RESULTS:                        10.9   12.5  )-----------( 241      ( 03 Oct 2018 10:14 )             31.9     10-03    128<L>  |  91<L>  |  32<H>  ----------------------------<  97  4.4   |  26  |  0.90    Ca    8.7      03 Oct 2018 10:00          MICROBIOLOGY:  RECENT CULTURES:  10-01 @ 08:34 .Urine Clean Catch (Midstream)     <10,000 CFU/ml Normal Urogenital bryant present          RADIOLOGY REVIEWED:
CC: f/u for post op leukocytosis    Patient reports complaints about lack of pain meds, she is receiving blood transfusion for hemoglobin of 6.6    REVIEW OF SYSTEMS:  All other review of systems negative (Comprehensive ROS)    Antimicrobials Day #  :off    Other Medications Reviewed    T(F): 97.4 (10-02-18 @ 14:00), Max: 98.1 (10-02-18 @ 06:05)  HR: 60 (10-02-18 @ 14:00)  BP: 118/63 (10-02-18 @ 14:00)  RR: 19 (10-02-18 @ 14:00)  SpO2: 95% (10-02-18 @ 14:00)  Wt(kg): --    PHYSICAL EXAM:  General: alert, no acute distress  Eyes:  anicteric, no conjunctival injection, no discharge  Oropharynx: no lesions or injection 	  Neck: supple, without adenopathy  Lungs: clear to auscultation  Heart: regular rate and rhythm; no murmur, rubs or gallops  Abdomen: soft, nondistended, nontender, without mass or organomegaly  Skin: no lesions  Extremities: no clubbing, cyanosis, or edema  Neurologic: alert, oriented, moves all extremities  RT hip dressing intact, mild thigh edema  LAB RESULTS:                        6.7    12.61 )-----------( 206      ( 02 Oct 2018 07:55 )             19.1     10-02    127<L>  |  93<L>  |  40<H>  ----------------------------<  86  4.5   |  25  |  1.13    Ca    8.5      02 Oct 2018 07:05          MICROBIOLOGY:  RECENT CULTURES:  10-01 @ 08:34 .Urine Clean Catch (Midstream)     <10,000 CFU/ml Normal Urogenital bryant present          RADIOLOGY REVIEWED:
ORTHO  Patient is a 89y old  Female who presents with a chief complaint of R hip Fx (01 Oct 2018 22:49)    Pt. resting without complaint    VS-  T(C): 36.7 (10-02-18 @ 06:05), Max: 36.8 (10-01-18 @ 08:00)  HR: 64 (10-02-18 @ 06:05) (60 - 68)  BP: 120/60 (10-02-18 @ 06:05) (102/62 - 120/60)  RR: 18 (10-02-18 @ 06:05) (16 - 18)  SpO2: 94% (10-02-18 @ 06:05) (94% - 96%)  Wt(kg): --    M.S. A&O  Extremity- Right LE- dressings in place  Neuro-              Motor- (+) ankle DF/PF              Sensation- grossly intact light touch              Calves- soft, nontender- PAS                               8.8    16.30 )-----------( 201      ( 01 Oct 2018 07:51 )             26.0     10-01    129<L>  |  94<L>  |  41<H>  ----------------------------<  167<H>  4.6   |  21<L>  |  1.34<H>    Ca    8.5      01 Oct 2018 06:42
ORTHO PROGRESS NOTE     Pt seen and examined at bedside. No significant overnight events. Pain well controlled.     Vital Signs Last 24 Hrs  T(C): 36.3 (01 Oct 2018 04:55), Max: 36.4 (30 Sep 2018 19:26)  T(F): 97.4 (01 Oct 2018 04:55), Max: 97.6 (30 Sep 2018 22:06)  HR: 70 (01 Oct 2018 04:55) (57 - 73)  BP: 133/66 (01 Oct 2018 04:55) (96/52 - 159/68)  BP(mean): 72 (30 Sep 2018 17:00) (72 - 98)  RR: 16 (01 Oct 2018 04:55) (14 - 18)  SpO2: 98% (01 Oct 2018 04:55) (93% - 100%)    Gen: No apparent distress, alert  LE:  Dressing moderately saturated distally. will change tomorrow          +DF/PF. moving toes          SILT, wwp at foot          compartments soft    Labs:  CBC Full  -  ( 30 Sep 2018 16:15 )  WBC Count : 16.0 K/uL  Hemoglobin : 9.0 g/dL  Hematocrit : 26.8 %  Platelet Count - Automated : 176 K/uL  Mean Cell Volume : 93.5 fl  Mean Cell Hemoglobin : 31.4 pg  Mean Cell Hemoglobin Concentration : 33.6 gm/dL  Auto Neutrophil # : 14.2 K/uL  Auto Lymphocyte # : 1.0 K/uL  Auto Monocyte # : 0.6 K/uL  Auto Eosinophil # : 0.1 K/uL  Auto Basophil # : 0.0 K/uL  Auto Neutrophil % : 89.0 %  Auto Lymphocyte % : 6.0 %  Auto Monocyte % : 3.9 %  Auto Eosinophil % : 0.8 %  Auto Basophil % : 0.2 %      09-30    132<L>  |  99  |  32<H>  ----------------------------<  115<H>  4.4   |  22  |  0.99    Ca    7.6<L>      30 Sep 2018 16:14    TPro  5.9<L>  /  Alb  3.0<L>  /  TBili  0.3  /  DBili  x   /  AST  29  /  ALT  24  /  AlkPhos  88  09-29      A/P  Pt is a 89y old Female s/p R hip IMN, POD #1    - Pain control/ Analgesia  - DVT ppx  - PT/OT - wbat/oob    - WBAT
Patient is a 89y old  Female who presents with a chief complaint of R hip Fx (03 Oct 2018 14:30)      POST OPERATIVE DAY #:  4  Patient comfortable       VS:  T(C): 36.6 (10-04-18 @ 04:54), Max: 36.7 (10-03-18 @ 12:13)  T(F): 97.8 (10-04-18 @ 04:54), Max: 98 (10-03-18 @ 12:13)  HR: 68 (10-04-18 @ 04:54) (60 - 75)  BP: 143/71 (10-04-18 @ 04:54) (124/69 - 150/77)  RR: 18 (10-04-18 @ 04:54) (18 - 18)  SpO2: 93% (10-04-18 @ 04:54) (93% - 97%)  Wt(kg): --      PHYSICAL EXAM:  NAD, Alert  EXT:   Rt  Hip: Dressing s changed  incisions clean sterile strips intact  No Calf Tenderness  (+) DF/PF; (+) Distal Pulses;  Sensation: No gross deficits noted      B/L, PAS     LABS:                        10.9<L>  12.5<H> )-----------( 241      ( 03 Oct 2018 10:14 )             31.9<L>    10-03    128<L>  |  91<L>  |  32<H>  ----------------------------<  97  4.4   |  26  |  0.90
Patient is a 89y old  Female who presents with a chief complaint of R hip fracture  Patient for operative fixation of right intertrochanteric hip fracture today  Patient resting now had severe pain and spasm overnight.  No chest pain, SOB, N/V.    T(C): 36.4 (09-30-18 @ 03:09), Max: 36.9 (09-30-18 @ 01:39)  HR: 60 (09-30-18 @ 06:59) (60 - 71)  BP: 119/69 (09-30-18 @ 06:59) (119/69 - 136/74)  RR: 18 (09-30-18 @ 05:13) (18 - 20)  SpO2: 99% (09-30-18 @ 05:13) (97% - 100%)    Exam:  Alert and Oriented, No Acute Distress  Cards: +S1/S2, RRR  Pulm: CTAB  Lower Extremities:Right L/E N/V intact, moving digits, pain on ROM  Calves Soft, Non-tender bilaterally  +PF/DF/EHL/FHL  SILT  +DP Pulse                        10.7   20.1  )-----------( 206      ( 30 Sep 2018 04:21 )             32.5    09-30  131<L>  |  96  |  32<H>  ----------------------------<  112<H>  4.1   |  23  |  1.16  Ca    8.3<L>      30 Sep 2018 04:21  TPro  5.9<L>  /  Alb  3.0<L>  /  TBili  0.3  /  DBili  x   /  AST  29  /  ALT  24  /  AlkPhos  88  09-29
Patient resting. Received pain medication a few minutes ago.     T(C): 36.4 (09-30-18 @ 03:09), Max: 36.9 (09-30-18 @ 01:39)  HR: 60 (09-30-18 @ 06:59) (60 - 71)  BP: 119/69 (09-30-18 @ 06:59) (119/69 - 136/74)  RR: 18 (09-30-18 @ 05:13) (18 - 20)  SpO2: 99% (09-30-18 @ 05:13) (97% - 100%)      Exam:  Awake, NAD, Daughter at bedside   Ext: RLE: shortened and externally rotated, foot warm, DP 2+                            10.7   20.1  )-----------( 206      ( 30 Sep 2018 04:21 )             32.5    09-30    131<L>  |  96  |  32<H>  ----------------------------<  112<H>  4.1   |  23  |  1.16    Ca    8.3<L>      30 Sep 2018 04:21    TPro  5.9<L>  /  Alb  3.0<L>  /  TBili  0.3  /  DBili  x   /  AST  29  /  ALT  24  /  AlkPhos  88  09-29
Post Operative Check    89y Female s/p R IMN  Pain controlled  NVI  Dressing C/D/I  No calf ttp  Vital Signs Last 24 Hrs  T(C): 36.4 (30 Sep 2018 22:06), Max: 36.9 (30 Sep 2018 01:39)  T(F): 97.6 (30 Sep 2018 22:06), Max: 98.4 (30 Sep 2018 01:39)  HR: 62 (30 Sep 2018 22:06) (57 - 71)  BP: 109/56 (30 Sep 2018 22:06) (96/52 - 159/68)  BP(mean): 72 (30 Sep 2018 17:00) (72 - 98)  RR: 16 (30 Sep 2018 22:06) (14 - 20)  SpO2: 94% (30 Sep 2018 22:06) (93% - 100%)    WBAT, FU Labs, Tx to floor
Pt S/E at bedside, no acute events overnight, pain controlled  Patient received PRBC yesterday 2/2 Low H/H, feeling much better this morning  daughter at bedside     Vital Signs Last 24 Hrs  T(C): 36.4 (03 Oct 2018 05:26), Max: 37.1 (03 Oct 2018 00:15)  T(F): 97.6 (03 Oct 2018 05:26), Max: 98.7 (03 Oct 2018 00:15)  HR: 67 (03 Oct 2018 05:26) (60 - 73)  BP: 142/61 (03 Oct 2018 05:26) (111/54 - 157/68)  BP(mean): --  RR: 18 (03 Oct 2018 05:26) (17 - 19)  SpO2: 95% (03 Oct 2018 05:26) (93% - 99%)    Gen: AAOx3    RLE:  Dressing has some blood tinged on the proximal and distal dressings, no need to change at this time  +EHL/FHL/TA/GS  SILT L3-S1  +DP/PT Pulses  Compartments soft  No calf TTP B/L
post op day 1, resting    REVIEW OF SYSTEMS:  GEN: no fever,    no chills  RESP: no SOB,   no cough  CVS: no chest pain,   no palpitations  GI: no abdominal pain,   no nausea,   no vomiting,   no constipation,   no diarrhea  : no dysuria,   no frequency  NEURO: no headache,   no dizziness  PSYCH: no depression,   not anxious  Derm : no rash    MEDICATIONS  (STANDING):  allopurinol 100 milliGRAM(s) Oral daily  amLODIPine   Tablet 10 milliGRAM(s) Oral daily  aspirin enteric coated 81 milliGRAM(s) Oral two times a day  ATENolol  Tablet 50 milliGRAM(s) Oral daily  docusate sodium 100 milliGRAM(s) Oral three times a day  influenza   Vaccine 0.5 milliLiter(s) IntraMuscular once  lactated ringers. 1000 milliLiter(s) (75 mL/Hr) IV Continuous <Continuous>  lisinopril 40 milliGRAM(s) Oral daily  oxybutynin 5 milliGRAM(s) Oral daily  polyethylene glycol 3350 17 Gram(s) Oral daily  senna 2 Tablet(s) Oral at bedtime    MEDICATIONS  (PRN):  magnesium hydroxide Suspension 30 milliLiter(s) Oral daily PRN Constipation  melatonin 3 milliGRAM(s) Oral at bedtime PRN Insomnia  morphine  - Injectable 1 milliGRAM(s) IV Push every 2 hours PRN Severe Pain (7 - 10)  oxyCODONE    IR 2.5 milliGRAM(s) Oral every 4 hours PRN Moderate Pain (4 - 6)  oxyCODONE    IR 5 milliGRAM(s) Oral every 4 hours PRN Severe Pain (7 - 10)      Vital Signs Last 24 Hrs  T(C): 36.3 (01 Oct 2018 04:55), Max: 36.4 (30 Sep 2018 19:26)  T(F): 97.4 (01 Oct 2018 04:55), Max: 97.6 (30 Sep 2018 22:06)  HR: 70 (01 Oct 2018 04:55) (57 - 73)  BP: 133/66 (01 Oct 2018 04:55) (96/52 - 159/68)  BP(mean): 72 (30 Sep 2018 17:00) (72 - 98)  RR: 16 (01 Oct 2018 04:55) (14 - 18)  SpO2: 98% (01 Oct 2018 04:55) (93% - 100%)  CAPILLARY BLOOD GLUCOSE        I&O's Summary    30 Sep 2018 07:01  -  01 Oct 2018 07:00  --------------------------------------------------------  IN: 385 mL / OUT: 350 mL / NET: 35 mL        PHYSICAL EXAM:  HEAD:  Atraumatic, Normocephalic  NECK: Supple, No   JVD  CHEST/LUNG:   no     rales,     no,    rhonchi  HEART: Regular rate and rhythm;         murmur  ABDOMEN: Soft, Nontender, ;   EXTREMITIES:        edema  NEUROLOGY:  alert    LABS:                        9.0    16.0  )-----------( 176      ( 30 Sep 2018 16:15 )             26.8     10    129<L>  |  94<L>  |  41<H>  ----------------------------<  167<H>  4.6   |  21<L>  |  1.34<H>    Ca    8.5      01 Oct 2018 06:42    TPro  5.9<L>  /  Alb  3.0<L>  /  TBili  0.3  /  DBili  x   /  AST  29  /  ALT  24  /  AlkPhos  88      PT/INR - ( 30 Sep 2018 04:21 )   PT: 10.8 sec;   INR: 1.00 ratio         PTT - ( 30 Sep 2018 04:21 )  PTT:29.2 sec      Urinalysis Basic - ( 30 Sep 2018 07:50 )    Color: Yellow / Appearance: Slightly Turbid / S.029 / pH: x  Gluc: x / Ketone: Negative  / Bili: Negative / Urobili: 3 mg/dL   Blood: x / Protein: 30 mg/dL / Nitrite: Negative   Leuk Esterase: Large / RBC: 15 /hpf /  /hpf   Sq Epi: x / Non Sq Epi: 11 /hpf / Bacteria: Moderate                      Consultant(s) Notes Reviewed:      Care Discussed with Consultants/Other Providers:
seen and examined. still with pain in right leg. no fever or chills, no cough, no sob. awaitign discharge to rehab today. CBC stable.     MEDICATIONS  (STANDING):  allopurinol 100 milliGRAM(s) Oral daily  amLODIPine   Tablet 10 milliGRAM(s) Oral daily  aspirin enteric coated 81 milliGRAM(s) Oral two times a day  ATENolol  Tablet 50 milliGRAM(s) Oral daily  influenza   Vaccine 0.5 milliLiter(s) IntraMuscular once  oxybutynin 5 milliGRAM(s) Oral daily  pantoprazole    Tablet 40 milliGRAM(s) Oral before breakfast  polyethylene glycol 3350 17 Gram(s) Oral daily    MEDICATIONS  (PRN):  acetaminophen   Tablet .. 650 milliGRAM(s) Oral every 6 hours PRN Temp greater or equal to 38C (100.4F), Mild Pain (1 - 3)  docusate sodium 100 milliGRAM(s) Oral three times a day PRN Constipation  magnesium hydroxide Suspension 30 milliLiter(s) Oral daily PRN Constipation  melatonin 3 milliGRAM(s) Oral at bedtime PRN Insomnia  morphine  - Injectable 1 milliGRAM(s) IV Push every 2 hours PRN Severe Pain (7 - 10)  oxyCODONE    IR 2.5 milliGRAM(s) Oral every 4 hours PRN Moderate Pain (4 - 6)  oxyCODONE    IR 5 milliGRAM(s) Oral every 4 hours PRN Severe Pain (7 - 10)  senna 2 Tablet(s) Oral at bedtime PRN Constipation      Allergies    No Known Allergies    Intolerances        Vital Signs Last 24 Hrs  T(C): 36.6 (04 Oct 2018 04:54), Max: 36.7 (03 Oct 2018 12:13)  T(F): 97.8 (04 Oct 2018 04:54), Max: 98 (03 Oct 2018 12:13)  HR: 68 (04 Oct 2018 04:54) (60 - 73)  BP: 143/71 (04 Oct 2018 04:54) (124/69 - 148/72)  BP(mean): --  RR: 18 (04 Oct 2018 04:54) (18 - 18)  SpO2: 93% (04 Oct 2018 04:54) (93% - 97%)    PHYSICAL EXAM:  aaox3, nad  dry mucous membranes  clear lungs  rrr s1 s2 no murmurs, PPM in place  abd soft nt nd  no edema  moving extrwemities to command  no focal deficits  mild pain at inciison sites.       LABS:                        9.8    11.2  )-----------( 250      ( 04 Oct 2018 07:13 )             29.2     10-04    132<L>  |  95<L>  |  36<H>  ----------------------------<  101<H>  4.1   |  26  |  0.87    Ca    8.5      04 Oct 2018 07:23            RADIOLOGY & ADDITIONAL STUDIES:

## 2018-10-04 NOTE — PROGRESS NOTE ADULT - PROVIDER SPECIALTY LIST ADULT
Cardiology
Infectious Disease
Internal Medicine
Orthopedics
Internal Medicine

## 2018-10-04 NOTE — DISCHARGE NOTE ADULT - CARE PLAN
Principal Discharge DX:	Closed fracture of right hip, initial encounter  Goal:	Pain relief, improvement with activities of daily living  Assessment and plan of treatment:	Follow up with Dr. Mcneal after rehab call for appointment. Out of bed ambulate, weight bearing as tolerated. Physical therapy to assist with exercise and help increase endurance. (D/C sutures or staples if applies pod#14- apply steri strips)

## 2018-10-04 NOTE — DISCHARGE NOTE ADULT - CARE PROVIDER_API CALL
Emile Mcneal), Orthopaedic Surgery  [address]  [address]  [place] [address]  Phone: [phone]  Fax: [phone]    Justo Perez (DO), Cardiology Medicine  [address]  [address]  [place] [address]  Phone: [phone]  Fax: [phone]    Sera Oviedo), Internal Medicine  [address]  [place] [address]  Phone: [phone]  Fax: [phone]

## 2018-10-04 NOTE — DISCHARGE NOTE ADULT - NS AS ACTIVITY OBS
Do not make important decisions/Do not drive or operate machinery/Driving allowed/No Heavy lifting/straining/Walking-Outdoors allowed/Keep wounds clean and dry/Walking-Indoors allowed

## 2018-10-04 NOTE — PROGRESS NOTE ADULT - REASON FOR ADMISSION
R hip Fx
R hip Fx/ Right IM Nail
Right hip Fracture
R hip Fx

## 2018-10-04 NOTE — DISCHARGE NOTE ADULT - MEDICATION SUMMARY - MEDICATIONS TO TAKE
I will START or STAY ON the medications listed below when I get home from the hospital:    traMADol 50 mg oral tablet  -- 50 milligram(s) by mouth once a day (at bedtime), As Needed  -- Indication: For Antihypertensive agent    acetaminophen 325 mg oral tablet  -- 2 tab(s) by mouth every 6 hours, As needed, Temp greater or equal to 38C (100.4F), Mild Pain (1 - 3)  -- Indication: For temps or pain    oxyCODONE 5 mg oral tablet  -- 1 tab(s) by mouth every 4 hours, As needed, Severe Pain (7 - 10)  -- Indication: For Pain    aspirin 81 mg oral delayed release tablet  -- 1 tab(s) by mouth 2 times a day  -- Indication: For Antiplatelet therapy    lisinopril 40 mg oral tablet  -- 1 tab(s) by mouth once a day  -- Indication: For Antihypertensive agent (held 10/2 per medicine 2nd to hyponatremia)    allopurinol 100 mg oral tablet  -- 1 tab(s) by mouth once a day  -- Indication: For Antigout agent    atenolol 50 mg oral tablet  -- 1 tab(s) by mouth once a day  -- Indication: For Antihypertensive agent    amLODIPine 10 mg oral tablet  -- 1 tab(s) by mouth once a day  -- Indication: For Antihypertensive agent    docusate sodium 100 mg oral capsule  -- 1 cap(s) by mouth 3 times a day, As needed, Constipation  -- Indication: For Stool softener    polyethylene glycol 3350 oral powder for reconstitution  -- 17 gram(s) by mouth once a day, As Needed  -- Indication: For Laxative    pantoprazole 40 mg oral delayed release tablet  -- 1 tab(s) by mouth once a day (before a meal)  -- Indication: For Antidyspepsia    oxybutynin 5 mg oral tablet  -- 1 tab(s) by mouth once a day  -- Indication: For Urinary antispasmotic agent

## 2018-10-04 NOTE — DISCHARGE NOTE ADULT - PLAN OF CARE
Pain relief, improvement with activities of daily living Follow up with Dr. Mcneal after rehab call for appointment. Out of bed ambulate, weight bearing as tolerated. Physical therapy to assist with exercise and help increase endurance. (D/C sutures or staples if applies pod#14- apply steri strips)

## 2018-10-04 NOTE — DISCHARGE NOTE ADULT - HOSPITAL COURSE
History of Present Illness: 	  89F community ambulatory w/ and w/o walker assitancepresents c/o R hip pain and inability to ambulate sp witnessed mechanical fall. Family present a bedside endorse HS/LOC. Denies numbness/tingling. Denies fever/chills. Denies pain/injury elsewhere. Denies orthopedic Hx and does not follow-up w/ an orthopedic doctor. Pacemaker recently inserted at [facility] 7/27/18.                 Review of Systems:  Review of Systems: CONSTITUTIONAL: No weakness, fevers or chills  EYES/ENT: No visual changes  NECK: No pain or stiffness  RESPIRATORY: No cough, wheezing,   CARDIOVASCULAR: No chest pain or palpitations  GASTROINTESTINAL: No nausea, vomiting, or hematemesis; No diarrhea or constipation.   GENITOURINARY: No dysuria, frequency or hematuria NEUROLOGICAL: No numbness or weakness	      Allergies and Intolerances:        Allergies:  	No Known Allergies:     Home Medications:   * Outpatient Medication Status not yet specified  · 	amLODIPine 10 mg oral tablet: 1 tab(s) orally once a day  · 	pantoprazole 40 mg oral delayed release tablet: 1 tab(s) orally once a day  · 	polyethylene glycol 3350 oral powder for reconstitution: 17 gram(s) orally once a day  · 	traMADol 50 mg oral tablet: 50 milligram(s) orally once a day (at bedtime), As Needed  · 	allopurinol 100 mg oral tablet: 1 tab(s) orally once a day  · 	aspirin 81 mg oral delayed release tablet: 1 tab(s) orally once a day  · 	lisinopril 40 mg oral tablet: 1 tab(s) orally once a day  · 	oxybutynin 5 mg/24 hours oral tablet, extended release: 1 tab(s) orally once a day  · 	atenolol 50 mg oral tablet: 1 tab(s) orally once a day    .    Patient History:    Past Medical History:  Congestive heart failure    Gout    Hypertension.     Past Surgical History:  Artificial cardiac pacemaker    H/O total hysterectomy    S/P cholecystectomy.  9/30/18- Patient presents to the hospital with right hip pain after a fall, x-ray reveals right hip fracture. Admitted, preop, medically cleared for surgery. Patient taken to the OR underwent hip fixation with an IM Nail- tolerated procedure without complications.  Patient was evaluated by Physical therapy whom recommended rehab for discharge plan.  Transfused PRBC' s for acute blood loss anemia from surgery.  Patient stable for surgery when bed becomes available.

## 2018-10-19 PROBLEM — Z00.00 ENCOUNTER FOR PREVENTIVE HEALTH EXAMINATION: Status: ACTIVE | Noted: 2018-10-19

## 2018-12-17 ENCOUNTER — APPOINTMENT (OUTPATIENT)
Dept: ORTHOPEDIC SURGERY | Facility: CLINIC | Age: 83
End: 2018-12-17
Payer: MEDICARE

## 2018-12-17 VITALS — HEIGHT: 63 IN | BODY MASS INDEX: 19.49 KG/M2 | WEIGHT: 110 LBS

## 2018-12-17 DIAGNOSIS — S72.145P: ICD-10-CM

## 2018-12-17 PROCEDURE — 99024 POSTOP FOLLOW-UP VISIT: CPT

## 2018-12-17 PROCEDURE — 73552 X-RAY EXAM OF FEMUR 2/>: CPT | Mod: RT

## 2018-12-28 ENCOUNTER — OUTPATIENT (OUTPATIENT)
Dept: OUTPATIENT SERVICES | Facility: HOSPITAL | Age: 83
LOS: 1 days | End: 2018-12-28
Payer: MEDICARE

## 2018-12-28 ENCOUNTER — APPOINTMENT (OUTPATIENT)
Dept: ULTRASOUND IMAGING | Facility: CLINIC | Age: 83
End: 2018-12-28
Payer: MEDICARE

## 2018-12-28 DIAGNOSIS — Z90.710 ACQUIRED ABSENCE OF BOTH CERVIX AND UTERUS: Chronic | ICD-10-CM

## 2018-12-28 DIAGNOSIS — Z95.0 PRESENCE OF CARDIAC PACEMAKER: Chronic | ICD-10-CM

## 2018-12-28 DIAGNOSIS — Z00.8 ENCOUNTER FOR OTHER GENERAL EXAMINATION: ICD-10-CM

## 2018-12-28 DIAGNOSIS — Z90.49 ACQUIRED ABSENCE OF OTHER SPECIFIED PARTS OF DIGESTIVE TRACT: Chronic | ICD-10-CM

## 2018-12-28 PROCEDURE — 93970 EXTREMITY STUDY: CPT

## 2018-12-28 PROCEDURE — 93970 EXTREMITY STUDY: CPT | Mod: 26

## 2020-04-27 NOTE — ED PROVIDER NOTE - CARDIAC, MLM
No Vaccines Administered. Normal rate, regular rhythm.  Heart sounds S1, S2.  No murmurs, rubs or gallops.

## 2023-11-22 NOTE — DISCHARGE NOTE ADULT - FUNCTIONAL STATUS DATE
Patient identified by name and date of birth   Patient received her last injection on 9/6/23  Patient denies any concerns with injection   Patient educated on the importance of compliance  Patient given injection in   patient tolerated well  Patient encouraged to schedule return visit prior to leaving the office  Patient encouraged to call office if she has any future questions or concerns Patient verbalized understanding     
04-Oct-2018

## 2023-12-27 NOTE — ED PROVIDER NOTE - RADIATION
----- Message from Josué Page sent at 12/27/2023  1:12 PM CST -----  Contact: Lisa/[facility]  Lisa/[facility] would lie a call back at [phone], in regards to patient having a stat CT scan and they are needing authorization from insurance.  Thanks   Am      no radiation

## 2025-03-14 NOTE — PHYSICAL THERAPY INITIAL EVALUATION ADULT - TRANSFER TRAINING, PT EVAL
Received request via: Patient    Was the patient seen in the last year in this department? Yes    Does the patient have an active prescription (recently filled or refills available) for medication(s) requested?  yes    Pharmacy Name: [facility]    Does the patient have longterm Plus and need 100-day supply? (This applies to ALL medications) Patient does not have SCP   GOAL: Patient will perform all transfers independently, in 4 weeks.